# Patient Record
Sex: MALE | Race: WHITE | NOT HISPANIC OR LATINO | Employment: OTHER | URBAN - METROPOLITAN AREA
[De-identification: names, ages, dates, MRNs, and addresses within clinical notes are randomized per-mention and may not be internally consistent; named-entity substitution may affect disease eponyms.]

---

## 2017-01-16 ENCOUNTER — TRANSFERRED RECORDS (OUTPATIENT)
Dept: HEALTH INFORMATION MANAGEMENT | Facility: CLINIC | Age: 77
End: 2017-01-16

## 2017-02-07 ENCOUNTER — TRANSFERRED RECORDS (OUTPATIENT)
Dept: HEALTH INFORMATION MANAGEMENT | Facility: CLINIC | Age: 77
End: 2017-02-07

## 2017-02-13 DIAGNOSIS — I73.00 RAYNAUD'S SYNDROME: Primary | ICD-10-CM

## 2017-02-13 DIAGNOSIS — I10 ESSENTIAL HYPERTENSION: ICD-10-CM

## 2017-02-13 RX ORDER — AMLODIPINE BESYLATE 5 MG/1
5 TABLET ORAL DAILY
Qty: 90 TABLET | Refills: 2 | Status: SHIPPED | OUTPATIENT
Start: 2017-02-13 | End: 2017-12-07

## 2017-02-21 ENCOUNTER — OFFICE VISIT (OUTPATIENT)
Dept: FAMILY MEDICINE | Facility: CLINIC | Age: 77
End: 2017-02-21

## 2017-02-21 VITALS
SYSTOLIC BLOOD PRESSURE: 126 MMHG | TEMPERATURE: 98.4 F | HEIGHT: 68 IN | DIASTOLIC BLOOD PRESSURE: 85 MMHG | OXYGEN SATURATION: 98 % | WEIGHT: 171 LBS | HEART RATE: 74 BPM | BODY MASS INDEX: 25.91 KG/M2

## 2017-02-21 DIAGNOSIS — Z01.84 IMMUNITY STATUS TESTING: ICD-10-CM

## 2017-02-21 DIAGNOSIS — Z00.00 MEDICARE ANNUAL WELLNESS VISIT, SUBSEQUENT: Primary | ICD-10-CM

## 2017-02-21 DIAGNOSIS — N40.0 PROSTATIC HYPERPLASIA: ICD-10-CM

## 2017-02-21 DIAGNOSIS — I10 ESSENTIAL HYPERTENSION: ICD-10-CM

## 2017-02-21 DIAGNOSIS — Z13.220 SCREENING CHOLESTEROL LEVEL: ICD-10-CM

## 2017-02-21 DIAGNOSIS — Z12.11 SCREEN FOR COLON CANCER: ICD-10-CM

## 2017-02-21 ASSESSMENT — PAIN SCALES - GENERAL: PAINLEVEL: NO PAIN (0)

## 2017-02-21 NOTE — LETTER
[facility]  [address]  [facility] [address]  Phone: [phone]  Fax: [phone]       Date: February 23, 2017      Joe GARCIA Yann  [address]  [facility] [address]        Hi Oli,    It was good to see you yesterday.  Here's a copy of your lab results.    First, you are immune to Chicken Pox, so you definitely had that as a child.  However, this means that you could get shingles.  Therefore, I recommend that you check with your insurance company and see if the shingles vaccine (Zostavax) is covered.  If it is, you should receive it.    Your cholesterol panel is about where it's been.    Based on your results, I plugged into this calculation:    The 10-year ASCVD risk score (Xavi SCHAFER Jr., et al., 2013) is: 33.7%    Values used to calculate the score:      Age: 76 years      Sex: Male      Is Non- : No      Diabetic: No      Tobacco smoker: No      Systolic Blood Pressure: 126 mmHg      Is Prescribed Antihypertensives: Yes      HDL Cholesterol: 42 mg/dL      Total Cholesterol: 237 mg/dL    As you can see, your risk is ~1 in 3 of having a heart attack in the next 10 years.  (This is largely based on your age alone.)  If we lowered your cholesterol level with a medication, we could drop your risk to ~16-20%.  Is this something you'd like to try?    Overall, your basic metabolic panel (which measures your glucose or sugar level, calcium, kidney function tests, and electrolytes) was completely normal, top to bottom.    Take care!    Van Rojas   Lipid Profile   Result Value Ref Range    Cholesterol 237 (H) <200 mg/dL      Comment:      Desirable:       <200 mg/dl    Triglycerides 242 (H) <150 mg/dL      Comment:      Borderline high:  150-199 mg/dl   High:             200-499 mg/dl   Very high:       >499 mg/dl      HDL Cholesterol 42 >39 mg/dL    LDL Cholesterol Calculated 146 (H)  <100 mg/dL      Comment:      Above desirable:  100-129 mg/dl   Borderline High:  130-159 mg/dL   High:             160-189 mg/dL   Very high:       >189 mg/dl      Non HDL Cholesterol 195 (H) <130 mg/dL      Comment:      Above Desirable:  130-159 mg/dl   Borderline high:  160-189 mg/dl   High:             190-219 mg/dl   Very high:       >219 mg/dl     Varicella Zoster Virus Antibody IgG   Result Value Ref Range    Varicella Zoster Virus Antibody IgG (H) 0.0 - 0.8 AI     >8.0  Positive, suggests prev. exposure and probable immunity   Antibody index (AI) values reflect qualitative changes in antibody   concentration that cannot be directly associated with clinical condition or   disease state.     Basic metabolic panel   Result Value Ref Range    Sodium 138 133 - 144 mmol/L    Potassium 4.2 3.4 - 5.3 mmol/L    Chloride 103 94 - 109 mmol/L    Carbon Dioxide 28 20 - 32 mmol/L    Anion Gap 8 3 - 14 mmol/L    Glucose 88 70 - 99 mg/dL    Urea Nitrogen 18 7 - 30 mg/dL    Creatinine 1.25 0.66 - 1.25 mg/dL    GFR Estimate 56 (L) >60 mL/min/1.7m2      Comment:      Non  GFR Calc    GFR Estimate If Black 68 >60 mL/min/1.7m2      Comment:       GFR Calc    Calcium 9.2 8.5 - 10.1 mg/dL

## 2017-02-21 NOTE — MR AVS SNAPSHOT
After Visit Summary   2/21/2017    Joe Lerner    MRN: [id number]           Patient Information     Date Of Birth          1940        Visit Information        Provider Department      2/21/2017 1:00 PM Van Foss MD [facility]        Today's Diagnoses     Medicare annual wellness visit, subsequent    -  1    Needs flu shot        Prostatic hyperplasia        Essential hypertension        Screening cholesterol level        Screen for colon cancer        Immunity status testing          Care Instructions      Preventive Health Recommendations:       Male Ages 65 and over    Yearly exam:             See your health care provider every year in order to  o   Review health changes.   o   Discuss preventive care.    o   Review your medicines if your doctor has prescribed any.    Talk with your health care provider about whether you should have a test to screen for prostate cancer (PSA).    Every 3 years, have a diabetes test (fasting glucose). If you are at risk for diabetes, you should have this test more often.    Every 5 years, have a cholesterol test. Have this test more often if you are at risk for high cholesterol or heart disease.     Every 10 years, have a colonoscopy. Or, have a yearly FIT test (stool test). These exams will check for colon cancer.    Talk to with your health care provider about screening for Abdominal Aortic Aneurysm if you have a family history of AAA or have a history of smoking.  Shots:     Get a flu shot each year.     Get a tetanus shot every 10 years.     Talk to your doctor about your pneumonia vaccines. There are now two you should receive - Pneumovax (PPSV 23) and Prevnar (PCV 13).    Talk to your doctor about a shingles vaccine.     Talk to your doctor about the hepatitis B vaccine.  Nutrition:     Eat at least 5 servings of fruits and vegetables each day.     Eat whole-grain bread, whole-wheat pasta and brown rice instead of white grains and  "rice.     Talk to your doctor about Calcium and Vitamin D.   Lifestyle    Exercise for at least 150 minutes a week (30 minutes a day, 5 days a week). This will help you control your weight and prevent disease.     Limit alcohol to one drink per day.     No smoking.     Wear sunscreen to prevent skin cancer.     See your dentist every six months for an exam and cleaning.     See your eye doctor every 1 to 2 years to screen for conditions such as glaucoma, macular degeneration and cataracts.        Follow-ups after your visit        Future tests that were ordered for you today     Open Future Orders        Priority Expected Expires Ordered    Fecal colorectal cancer screen FIT Routine 3/14/2017 5/16/2017 2/21/2017            Who to contact     Please call your clinic at [phone] to:    Ask questions about your health    Make or cancel appointments    Discuss your medicines    Learn about your test results    Speak to your doctor   If you have compliments or concerns about an experience at your clinic, or if you wish to file a complaint, please contact [facility] Patient Relations at [phone] or email us at [email or web address]         Additional Information About Your Visit        Care EveryWhere ID     This is your Care EveryWhere ID. This could be used by other organizations to access your [place] medical records  [id number]        Your Vitals Were     Pulse Temperature Height Pulse Oximetry BMI (Body Mass Index)       74 98.4  F (36.9  C) (Oral) 5' 7.72\" (172 cm) 98% 26.22 kg/m2        Blood Pressure from Last 3 Encounters:   02/21/17 126/85   12/12/16 117/75   11/04/15 127/79    Weight from Last 3 Encounters:   02/21/17 171 lb (77.6 kg)   12/12/16 175 lb (79.4 kg)   11/04/15 170 lb 1.3 oz (77.1 kg)              We Performed the Following     Basic metabolic panel     Lipid Profile     Varicella Zoster Virus Antibody IgG        Primary Care Provider Office Phone # " Fax #    Van Foss [phone]       [facility] [address] [place] [id number]        Thank you!     Thank you for choosing [facility]  for your care. Our goal is always to provide you with excellent care. Hearing back from our patients is one way we can continue to improve our services. Please take a few minutes to complete the written survey that you may receive in the mail after your visit with us. Thank you!             Your Updated Medication List - Protect others around you: Learn how to safely use, store and throw away your medicines at www.disposemymeds.org.          This list is accurate as of: 2/21/17  3:56 PM.  Always use your most recent med list.                   Brand Name Dispense Instructions for use    amLODIPine 5 MG tablet    NORVASC    90 tablet    Take 1 tablet (5 mg) by mouth daily       ANDROGEL 1% 25 MG/2.5GM (1%) gel   Generic drug:  testosterone      Place 25 mg onto the skin daily       clonazePAM 2 MG tablet    klonoPIN    30 tablet    Take 1 tab at bedtime for sleep - may refill every 30 days.  Needs appt for any further refills       HERBALS          lisinopril 5 MG tablet    PRINIVIL/ZESTRIL    90 tablet    Take 1 tablet (5 mg) by mouth daily       SLEEP AID PO          VIAGRA 50 MG cap/tab   Generic drug:  sildenafil      Take 100 mg by mouth daily as needed       vitamin D 1000 UNITS capsule      Take 1 capsule by mouth daily

## 2017-02-21 NOTE — NURSING NOTE
"    Chief Complaint   Patient presents with     Physical     Fall Risk Assessment question.     76 year old      Blood pressure 126/85, pulse 74, temperature 98.4  F (36.9  C), temperature source Oral, height 5' 7.72\" (172 cm), weight 171 lb (77.6 kg), SpO2 98 %. Body mass index is 26.22 kg/(m^2).    Wt Readings from Last 2 Encounters:   02/21/17 171 lb (77.6 kg)   12/12/16 175 lb (79.4 kg)     BP Readings from Last 3 Encounters:   02/21/17 126/85   12/12/16 117/75   11/04/15 127/79       Patient Active Problem List   Diagnosis     Preventative health care     Hypertension     Prostatic hyperplasia     Raynaud's syndrome     Persistent insomnia     Hypotestosteronism       Current Outpatient Prescriptions   Medication Sig Dispense Refill     amLODIPine (NORVASC) 5 MG tablet Take 1 tablet (5 mg) by mouth daily 90 tablet 2     lisinopril (PRINIVIL/ZESTRIL) 5 MG tablet Take 1 tablet (5 mg) by mouth daily 90 tablet 0     testosterone (ANDROGEL 1%) 25 MG/2.5GM (1%) gel Place 25 mg onto the skin daily       clonazePAM (KLONOPIN) 2 MG tablet Take 1 tab at bedtime for sleep - may refill every 30 days.  Needs appt for any further refills 30 tablet 2     HERBALS        Cholecalciferol (VITAMIN D) 1000 UNITS capsule Take 1 capsule by mouth daily       sildenafil (VIAGRA) 50 MG tablet Take 100 mg by mouth daily as needed          Social History   Substance Use Topics     Smoking status: Never Smoker     Smokeless tobacco: Never Used     Alcohol use No         Health Maintenance Due   Topic Date Due     ADVANCE DIRECTIVE PLANNING Q5 YRS (NO INBASKET)  10/12/1958     FALL RISK ASSESSMENT  10/12/2004       NICK MERCEDES CMA  February 21, 2017 1:00 PM    "

## 2017-02-22 LAB
ANION GAP SERPL CALCULATED.3IONS-SCNC: 8 MMOL/L (ref 3–14)
BUN SERPL-MCNC: 18 MG/DL (ref 7–30)
CALCIUM SERPL-MCNC: 9.2 MG/DL (ref 8.5–10.1)
CHLORIDE SERPL-SCNC: 103 MMOL/L (ref 94–109)
CHOLEST SERPL-MCNC: 237 MG/DL
CO2 SERPL-SCNC: 28 MMOL/L (ref 20–32)
CREAT SERPL-MCNC: 1.25 MG/DL (ref 0.66–1.25)
GFR SERPL CREATININE-BSD FRML MDRD: 56 ML/MIN/1.7M2
GLUCOSE SERPL-MCNC: 88 MG/DL (ref 70–99)
HDLC SERPL-MCNC: 42 MG/DL
LDLC SERPL CALC-MCNC: 146 MG/DL
NONHDLC SERPL-MCNC: 195 MG/DL
POTASSIUM SERPL-SCNC: 4.2 MMOL/L (ref 3.4–5.3)
SODIUM SERPL-SCNC: 138 MMOL/L (ref 133–144)
TRIGL SERPL-MCNC: 242 MG/DL
VZV IGG SER QL IA: ABNORMAL AI (ref 0–0.8)

## 2017-02-24 DIAGNOSIS — Z12.11 SCREEN FOR COLON CANCER: ICD-10-CM

## 2017-02-24 PROCEDURE — 82274 ASSAY TEST FOR BLOOD FECAL: CPT | Performed by: FAMILY MEDICINE

## 2017-02-26 LAB — HEMOCCULT STL QL IA: NEGATIVE

## 2017-03-05 NOTE — PROGRESS NOTES
CHIEF COMPLAINT:  Annual physical exam.      HISTORY OF PRESENT ILLNESS:  Joe is a 76-year-old gentleman here for the above.  He is in the process of retiring and has been working 7 days a week.  He is looking forward to cutting back a bit.      ACTIVE MEDICAL PROBLEMS:  Reviewed.      CURRENT MEDICATIONS:  Reviewed.      SOCIAL HISTORY, FAMILY HISTORY AND PAST SURGICAL HISTORY:  Reviewed.      HEALTHCARE MAINTENANCE:  He wears glasses and eyes are checked every 6 months.  He was just there and given that the intraocular pressure has improved so much, he does not have to go back for about another a year.  From a hearing standpoint, there has been no change.  He saw my colleague, Dr. Sullivan and had some cerumen removed from his left ear.  Dental care is up-to-date and he goes in every 6 months.  Blood pressure is ideal at 126/85.  BMI very close to ideal at 26.22.  Weight has dropped from 175 to 171 intentionally.  He is taking a supplement, believes it is helping him.  From an immunization standpoint, we are going to go ahead and check a chickenpox (varicella zoster) virus antibody level as he cannot recall ever having chickenpox.  He would like to check that before getting the shingles vaccine, which he is open to that.  We will check a lipid panel.  From a colon cancer screening standpoint, we are going to do a FIT card.  We will also check a metabolic panel.      From a Medicare screening standpoint, falls assessment was done.  He has had no falls and does not use any adaptive devices at home.  He is quite physically active.  PHQ-2 was 0.  No  memory loss concerns.  Finally, he is able to take care of all activities of daily living at home without assistance.  As mentioned, he has been working 7 days a week and is looking at retiring.      OBJECTIVE:  Oli is in absolutely no distress.  Blood pressure 126/85 with a heart rate of 74.  Temperature is 98.4.  He is 5 feet 7.72 inches in height and weighs 171 with  a BMI of 26.22.  O2 sats are 98% on room air.  He is completely alert and oriented x3.  Affect is very upbeat.   HEENT:  Head is normocephalic, atraumatic.  Ears are free of any cerumen.  The cerumen that had been present in the left ear is now gone.  No pain with palpation of the frontal or maxillary sinuses.  Eyes are grossly normal.  Nose is free of any congestion or discharge.  Teeth in good repair.  Mucous membranes are moist.  Throat looks benign.   NECK:  Supple without any anterior or posterior chain adenopathy.  No carotid bruits are heard.   BACK:  Smooth and straight.  No pain to percussion.  No CVA tenderness.   LUNGS:  Clear to auscultation bilaterally.   HEART:  Reveals a regular rate and rhythm without murmur.   Ankles are free of any edema.      Labs are pending.      ASSESSMENT AND PLAN:   1.  Adult physical exam, up-to-date with healthcare maintenance strategies.   2.  Lipid panel and basic metabolic panels both pending.   3.  FIT card for colon cancer screening purposes.   4.  Varicella zoster titer screening for immune status testing.   5.  Call with any problems or questions.

## 2017-03-15 DIAGNOSIS — I10 HYPERTENSION: ICD-10-CM

## 2017-03-16 RX ORDER — LISINOPRIL 5 MG/1
TABLET ORAL
Qty: 90 TABLET | Refills: 3 | Status: SHIPPED | OUTPATIENT
Start: 2017-03-16 | End: 2018-04-20

## 2017-03-17 DIAGNOSIS — F51.01 PRIMARY INSOMNIA: ICD-10-CM

## 2017-03-17 RX ORDER — CLONAZEPAM 2 MG/1
TABLET ORAL
Qty: 30 TABLET | Refills: 2 | Status: SHIPPED | OUTPATIENT
Start: 2017-03-17 | End: 2017-06-21

## 2017-04-04 ENCOUNTER — ALLIED HEALTH/NURSE VISIT (OUTPATIENT)
Dept: FAMILY MEDICINE | Facility: CLINIC | Age: 77
End: 2017-04-04

## 2017-04-04 DIAGNOSIS — Z23 NEED FOR SHINGLES VACCINE: Primary | ICD-10-CM

## 2017-04-04 NOTE — MR AVS SNAPSHOT
After Visit Summary   2017    Joe Lerner    MRN: [id number]           Patient Information     Date Of Birth          1940        Visit Information        Provider Department      2017 1:15 PM Nurse, [facility]        Today's Diagnoses     Need for shingles vaccine    -  1       Follow-ups after your visit        Who to contact     Please call your clinic at [phone] to:    Ask questions about your health    Make or cancel appointments    Discuss your medicines    Learn about your test results    Speak to your doctor   If you have compliments or concerns about an experience at your clinic, or if you wish to file a complaint, please contact [facility] Patient Relations at [phone] or email us at [email or web address]         Additional Information About Your Visit        MyChart Information     [employer] is an electronic gateway that provides easy, online access to your medical records. With [employer], you can request a clinic appointment, read your test results, renew a prescription or communicate with your care team.     To sign up for [employer] visit the website at [email or web address]   You will be asked to enter the access code listed below, as well as some personal information. Please follow the directions to create your username and password.     Your access code is: [email or web address]  Expires: 7/3/2017  1:49 PM     Your access code will  in 90 days. If you need help or a new code, please contact your [facility] or call [phone] for assistance.        Care EveryWhere ID     This is your Care EveryWhere ID. This could be used by other organizations to access your [place] medical records  [id number]         Blood Pressure from Last 3 Encounters:   17 126/85   16 117/75   11/04/15 127/79    Weight from Last 3 Encounters:   17 171 lb (77.6 kg)   16 175 lb (79.4  kg)   11/04/15 170 lb 1.3 oz (77.1 kg)              We Performed the Following     VACCINE ADMINISTRATION, INITIAL     ZOSTER VACC LIVE SUBQ NJX        Primary Care Provider Office Phone # Fax #    Van Foss [phone]       [facility] [address] [facility] [address]        Thank you!     Thank you for choosing [facility]  for your care. Our goal is always to provide you with excellent care. Hearing back from our patients is one way we can continue to improve our services. Please take a few minutes to complete the written survey that you may receive in the mail after your visit with us. Thank you!             Your Updated Medication List - Protect others around you: Learn how to safely use, store and throw away your medicines at www.disposemymeds.org.          This list is accurate as of: 4/4/17  1:49 PM.  Always use your most recent med list.                   Brand Name Dispense Instructions for use    amLODIPine 5 MG tablet    NORVASC    90 tablet    Take 1 tablet (5 mg) by mouth daily       ANDROGEL 1% 25 MG/2.5GM (1%) gel   Generic drug:  testosterone      Place 25 mg onto the skin daily       clonazePAM 2 MG tablet    klonoPIN    30 tablet    TAKE 1 TABLET BY MOUTH AT BEDTIME AS NEEDED FOR SLEEP       HERBALS          lisinopril 5 MG tablet    PRINIVIL/ZESTRIL    90 tablet    TAKE 1 TABLET BY MOUTH EVERY DAY       SLEEP AID PO          VIAGRA 50 MG cap/tab   Generic drug:  sildenafil      Take 100 mg by mouth daily as needed       vitamin D 1000 UNITS capsule      Take 1 capsule by mouth daily

## 2017-04-04 NOTE — NURSING NOTE
Joe Lerner comes into clinic today at the request of Shingle vaccine. Ordering Provider for Dr. Foss.      This service provided today was under the supervising provider of the radha White, who was available if needed.    Rosangela Lao

## 2017-04-06 ENCOUNTER — TELEPHONE (OUTPATIENT)
Dept: FAMILY MEDICINE | Facility: CLINIC | Age: 77
End: 2017-04-06

## 2017-04-06 NOTE — TELEPHONE ENCOUNTER
----- Message from Van Foss MD sent at 4/4/2017  7:59 AM CDT -----  Regarding: RE: Medication to prevent heart attacks?  Contact: [phone]  Yes, the safest, best, most inexpensive way to do this is to take ASA 81 mg daily.    Thanks!    Van  ----- Message -----     From: Rita Marvin RN     Sent: 4/3/2017   2:48 PM       To: Van Foss MD  Subject: FW: Medication to prevent heart attacks?         Any idea what this is about ? Baby ASA? I don't see anything in your note  ----- Message -----     From: Melanie Viveros     Sent: 4/3/2017  12:12 PM       To: [facility] Nurse Pool  Subject: Medication to prevent heart attacks?             Pt wanted to know about the medication Dr. Foss told him about re: lessening the percentage of having a heart attack. He wanted to see about starting it.   Please call pt back at [phone]    Thank you  Melanie Viveros  Intake representative  Call Center

## 2017-06-21 DIAGNOSIS — F51.01 PRIMARY INSOMNIA: ICD-10-CM

## 2017-06-21 RX ORDER — CLONAZEPAM 2 MG/1
TABLET ORAL
Qty: 30 TABLET | Refills: 2
Start: 2017-06-21 | End: 2017-10-16

## 2017-07-13 ENCOUNTER — TELEPHONE (OUTPATIENT)
Dept: FAMILY MEDICINE | Facility: CLINIC | Age: 77
End: 2017-07-13

## 2017-07-13 NOTE — TELEPHONE ENCOUNTER
----- Message from Gopal Staffordnick sent at 7/10/2017  1:13 PM CDT -----  Regarding: PT has questions about self colonoscopy kit  Contact: [phone]  PT stated that Dr. Foss gave him a self colonoscopy kit and sent samples in a while ago.  PT was wondering if the clinic has received a response or results back.  PT would like a call back and can be reached at [phone].    Thank you,  Gopal    [facility]

## 2017-10-16 DIAGNOSIS — F51.01 PRIMARY INSOMNIA: ICD-10-CM

## 2017-10-16 RX ORDER — CLONAZEPAM 2 MG/1
TABLET ORAL
Qty: 30 TABLET | Refills: 2
Start: 2017-10-16 | End: 2018-01-18

## 2017-12-07 DIAGNOSIS — I10 ESSENTIAL HYPERTENSION: ICD-10-CM

## 2017-12-07 RX ORDER — AMLODIPINE BESYLATE 5 MG/1
5 TABLET ORAL DAILY
Qty: 90 TABLET | Refills: 0 | Status: SHIPPED | OUTPATIENT
Start: 2017-12-07 | End: 2018-03-08

## 2018-01-18 DIAGNOSIS — F51.01 PRIMARY INSOMNIA: ICD-10-CM

## 2018-01-18 RX ORDER — CLONAZEPAM 2 MG/1
TABLET ORAL
Qty: 30 TABLET | Refills: 2 | Status: SHIPPED | OUTPATIENT
Start: 2018-01-18 | End: 2018-04-17

## 2018-01-18 NOTE — TELEPHONE ENCOUNTER
I called this prescription for Clonazepam into  [facility].  Approved per Dr. Foss.  Jaylin JOSE  [facility]  January 18, 2018 3:57 PM

## 2018-02-08 ENCOUNTER — TRANSFERRED RECORDS (OUTPATIENT)
Dept: HEALTH INFORMATION MANAGEMENT | Facility: CLINIC | Age: 78
End: 2018-02-08

## 2018-02-27 ENCOUNTER — OFFICE VISIT (OUTPATIENT)
Dept: FAMILY MEDICINE | Facility: CLINIC | Age: 78
End: 2018-02-27
Payer: COMMERCIAL

## 2018-02-27 VITALS
TEMPERATURE: 97.7 F | OXYGEN SATURATION: 99 % | HEIGHT: 69 IN | HEART RATE: 66 BPM | BODY MASS INDEX: 25.05 KG/M2 | SYSTOLIC BLOOD PRESSURE: 114 MMHG | DIASTOLIC BLOOD PRESSURE: 77 MMHG | WEIGHT: 169.13 LBS

## 2018-02-27 DIAGNOSIS — I10 ESSENTIAL HYPERTENSION: ICD-10-CM

## 2018-02-27 DIAGNOSIS — Z00.00 MEDICARE ANNUAL WELLNESS VISIT, SUBSEQUENT: Primary | ICD-10-CM

## 2018-02-27 DIAGNOSIS — Z13.220 SCREENING CHOLESTEROL LEVEL: ICD-10-CM

## 2018-02-27 DIAGNOSIS — Z12.11 SCREENING FOR COLON CANCER: ICD-10-CM

## 2018-02-27 DIAGNOSIS — N40.0 PROSTATIC HYPERPLASIA: ICD-10-CM

## 2018-02-27 LAB
BUN SERPL-MCNC: 15 MG/DL (ref 7–30)
CALCIUM SERPL-MCNC: 9.3 MG/DL (ref 8.5–10.4)
CHLORIDE SERPLBLD-SCNC: 104 MMOL/L (ref 94–109)
CHOLEST SERPL-MCNC: 219 MG/DL (ref 0–200)
CHOLEST/HDLC SERPL: 4.3 {RATIO} (ref 0–5)
CO2 SERPL-SCNC: 29 MMOL/L (ref 20–32)
CREAT SERPL-MCNC: 1.1 MG/DL (ref 0.8–1.5)
EGFR CALCULATED (BLACK REFERENCE): 83.3
EGFR CALCULATED (NON BLACK REFERENCE): 68.8
FASTING SPECIMEN: YES
GLUCOSE SERPL-MCNC: 95 MG/DL (ref 60–109)
HDLC SERPL-MCNC: 51 MG/DL
LDLC SERPL CALC-MCNC: 137 MG/DL (ref 0–129)
POTASSIUM SERPL-SCNC: 4 MMOL/L (ref 3.4–5.3)
SODIUM SERPL-SCNC: 141 MMOL/L (ref 133–144)
TRIGL SERPL-MCNC: 152 MG/DL (ref 0–150)
VLDL-CHOLESTEROL: 30 (ref 7–32)

## 2018-02-27 NOTE — LETTER
"                                      [facility]  [address]  [facility] [address]  Phone: [phone]  Fax: [phone]       Date: February 28, 2018      Joe JOSE Lerner  [address]  [facility] [address]        Hi Oli,    It was great to see you yesterday (Tuesday).  Here's a copy of your lab results.    Overall, your lipid/cholesterol panel looks quite good.  Your total has dropped from 237 to 219, the best/lowest it's been in nearly two years.  Your HDL (or \"good\") cholesterol is the highest/best it's ever been--all due to your walking!  The LDL (\"bad\") cholesterol level is slightly elevated, but I'm not concerned about that.    Your basic metabolic panel (which measures your glucose or sugar level, calcium, kidney function tests, and electrolytes) was completely normal, top to bottom.    Take care!    Van Rojas   Basic Metabolic Panel ([place])   Result Value Ref Range    Glucose 95.0 60.0 - 109.0 mg/dL    Urea Nitrogen 15.0 7.0 - 30.0 mg/dL    Calcium 9.3 8.5 - 10.4 mg/dL    Creatinine 1.1 0.8 - 1.5 mg/dL    eGFR Calculated (Non Black Reference) 68.8 >60.0    eGFR Calculated (Black Reference) 83.3 >60.0    Sodium 141.0 133.0 - 144.0 mmol/L    Potassium 4.0 3.4 - 5.3 mmol/L    Chloride 104.0 94.0 - 109.0 mmol/L    Carbon Dioxide 29.0 20.0 - 32.0 mmol/L   Lipid Panel ([place])   Result Value Ref Range    FASTING SPECIMEN yes     Cholesterol 219.0 (H) 0.0 - 200.0    HDL Cholesterol 51.0 >40.0    Triglycerides 152.0 (H) 0.0 - 150.0    Cholesterol/HDL Ratio 4.3 0.0 - 5.0    LDL Cholesterol Direct 137.0 (H) 0.0 - 129.0    VLDL-Cholesterol 30.0 7.0 - 32.0             "

## 2018-02-27 NOTE — MR AVS SNAPSHOT
"              After Visit Summary   2/27/2018    Joe Lerner    MRN: [id number]           Patient Information     Date Of Birth          1940        Visit Information        Provider Department      2/27/2018 1:00 PM Van Foss MD [facility]        Today's Diagnoses     Medicare annual wellness visit, subsequent    -  1    Essential hypertension        Screening cholesterol level        Prostatic hyperplasia        Screening for colon cancer           Follow-ups after your visit        Future tests that were ordered for you today     Open Future Orders        Priority Expected Expires Ordered    Fecal colorectal cancer screen FIT Routine 3/20/2018 5/22/2018 2/27/2018            Who to contact     Please call your clinic at [phone] to:    Ask questions about your health    Make or cancel appointments    Discuss your medicines    Learn about your test results    Speak to your doctor            Additional Information About Your Visit        Care EveryWhere ID     This is your Care EveryWhere ID. This could be used by other organizations to access your [facility] medical records  [id number]        Your Vitals Were     Pulse Temperature Height Pulse Oximetry BMI (Body Mass Index)       66 97.7  F (36.5  C) (Oral) 5' 9.05\" (175.4 cm) 99% 24.94 kg/m2        Blood Pressure from Last 3 Encounters:   02/27/18 114/77   02/21/17 126/85   12/12/16 117/75    Weight from Last 3 Encounters:   02/27/18 169 lb 2 oz (76.7 kg)   02/21/17 171 lb (77.6 kg)   12/12/16 175 lb (79.4 kg)              We Performed the Following     Basic Metabolic Panel ([place])     Lipid Panel ([place])        Primary Care Provider Office Phone # Fax #    Van Foss [phone]       [address] [place] [id number]        Equal Access to Services     OLENA HEALY AH: joaquim Ayala qaybta kaalmada adeegyada, waxay idiin hayaan adeeg kharash la'aan . So wa " [phone].    ATENCIÓN: Si ann wyatt, tiene a mcmahan disposición servicios gratuitos de asistencia lingüística. Rafael ledesma [phone].    We comply with applicable federal civil rights laws and [place] laws. We do not discriminate on the basis of race, color, national origin, age, disability, sex, sexual orientation, or gender identity.            Thank you!     Thank you for choosing [facility]  for your care. Our goal is always to provide you with excellent care. Hearing back from our patients is one way we can continue to improve our services. Please take a few minutes to complete the written survey that you may receive in the mail after your visit with us. Thank you!             Your Updated Medication List - Protect others around you: Learn how to safely use, store and throw away your medicines at www.disposemymeds.org.          This list is accurate as of 2/27/18  1:32 PM.  Always use your most recent med list.                   Brand Name Dispense Instructions for use Diagnosis    amLODIPine 5 MG tablet    NORVASC    90 tablet    Take 1 tablet (5 mg) by mouth daily    Essential hypertension       ANDROGEL 1% 25 MG/2.5GM (1%) gel   Generic drug:  testosterone      Place 25 mg onto the skin daily        clonazePAM 2 MG tablet    klonoPIN    30 tablet    TAKE 1 TABLET BY MOUTH AT BEDTIME AS NEEDED FOR SLEEP.  Must last 30 days between refills.    Primary insomnia       HERBALS           lisinopril 5 MG tablet    PRINIVIL/ZESTRIL    90 tablet    TAKE 1 TABLET BY MOUTH EVERY DAY    Hypertension       SLEEP AID PO           VIAGRA 50 MG tablet   Generic drug:  sildenafil      Take 100 mg by mouth daily as needed        vitamin D 1000 UNITS capsule      Take 1 capsule by mouth daily

## 2018-02-27 NOTE — NURSING NOTE
"77 year old  Chief Complaint   Patient presents with     Physical     no concerns       Blood pressure 114/77, pulse 66, temperature 97.7  F (36.5  C), temperature source Oral, height 5' 9.05\" (175.4 cm), weight 169 lb 2 oz (76.7 kg), SpO2 99 %. Body mass index is 24.94 kg/(m^2).  Patient Active Problem List   Diagnosis     Preventative health care     Hypertension     Prostatic hyperplasia     Raynaud's syndrome     Persistent insomnia     Hypotestosteronism       Wt Readings from Last 2 Encounters:   02/27/18 169 lb 2 oz (76.7 kg)   02/21/17 171 lb (77.6 kg)     BP Readings from Last 3 Encounters:   02/27/18 114/77   02/21/17 126/85   12/12/16 117/75         Current Outpatient Prescriptions   Medication     clonazePAM (KLONOPIN) 2 MG tablet     amLODIPine (NORVASC) 5 MG tablet     lisinopril (PRINIVIL/ZESTRIL) 5 MG tablet     Doxylamine Succinate, Sleep, (SLEEP AID PO)     testosterone (ANDROGEL 1%) 25 MG/2.5GM (1%) gel     HERBALS     Cholecalciferol (VITAMIN D) 1000 UNITS capsule     sildenafil (VIAGRA) 50 MG tablet     No current facility-administered medications for this visit.        Social History   Substance Use Topics     Smoking status: Never Smoker     Smokeless tobacco: Never Used     Alcohol use No       Health Maintenance Due   Topic Date Due     ADVANCE DIRECTIVE PLANNING Q5 YRS  10/12/1994     FALL RISK ASSESSMENT  02/21/2018       No results found for: PAP      February 27, 2018 1:01 PM  "

## 2018-02-27 NOTE — PROGRESS NOTES
CHIEF COMPLAINT:  Medicare annual wellness visit, subsequent.      HISTORY OF PRESENT ILLNESS:  Oli is a 77-year-old who is here for the above.  Overall, he is doing very well.  [place] [employer] [place] 45 minutes a day, 7 days a week.  As a result, his blood pressure has never been better.  His weight is down a bit.  He feels great.        ACTIVE MEDICAL PROBLEMS:  Reviewed.  He does have some benign prostatic hyperplasia and is followed closely by [facility].  Most recent PSA level was elevated at 12.32.  He is on testosterone and that level was 776.5.  Other values were calculated and measured.  CBC with platelets was normal.  Again, followed in the [facility] at [facility] for those purposes.      His medications and supplements were reviewed.  He has 3 prescriptions from me and these include:  Amlodipine 5 mg daily along with lisinopril 5 mg once daily for his essential hypertension.  He is also using clonazepam 2 mg virtually every night.  This medication has allowed him to sleep for 8 hours a night.  He feels refreshed in the morning.  He wakes up exactly once to void after 4-5 hours but is able to return to sleep soon after that.      He has some Raynaud's syndrome and his feet are cooler than they had been.  The amlodipine has helped and his hands basically feel quite good.  We discussed increasing the dose at some point but not when his blood pressure is as excellent as it is right now.      MEDICARE QUESTIONS:  No problems with any activities of daily living.  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  He has had no falls at home in the last year.  No symptoms of depression.  No overt memory loss and in fact, he feels that his memory is actually better.  He has figured out some ways to remember people's names and things that would otherwise escape him.  In short, he has no concerns about his memory at this time.      HEALTHCARE  MAINTENANCE:  He wears glasses and eye care is up to date.  He was recently told by his ophthalmologist that it is becoming time to get a cataract taken care of.  He will be doing that sometime this spring or summer.  From a hearing standpoint, he always has some wax, mostly in the left ear.  This has been removed at least once by ENT.  Hearing seems to be unaffected however.  Dental care occurs every 6 months.  Blood pressure is absolutely perfect at 114/77.  BMI is also perfect at 24.94.  Immunizations up-to-date.  The only thing I can recommend today would be the new Shingrix vaccine when that becomes available.  He will get that at the pharmacy due to the fact that it is covered by Medicare there.  PSA 50 [facility].  Though he is 77, he would like to do colon cancer screening for at least another couple of years.  We will go ahead with a FIT card as this is the least invasive and is the simplest way to do that.      REVIEW OF SYSTEMS:  A 10-point review of symptoms was negative.  He has a history of some neck pain and he has now found that by getting massage therapy once or twice a month, along with chiropractic adjustment, altering his gait and posture and also wearing a dental appliance (to prevent grinding) he is doing very well in that regard.      OBJECTIVE:  Oli is in absolutely no distress.  He is alert and oriented x3.  Affect is very upbeat.  Speech is normal rate and quality.  He is very insightful.  Affect is very upbeat.  BP is 114/77 with a heart rate of 66 and regular.  Temperature is 97.7.  He is 5 feet 9 and weighs 169 with a BMI of 24.94.  O2 sats are 99% on room air.  HEENT:  Head is normocephalic, atraumatic.  Ears are free of any cerumen.  The right ear shows absolutely no cerumen in the canal.  Left canal shows some cerumen but it is certainly not obstructing the canal and certainly not his hearing any way.  He does not need to have this cleaned at this time.  There is no pain with  palpation of the frontal or maxillary sinuses.  Eyes are grossly normal.  Nose is free of any congestion or discharge.  Teeth in good repair.  Mucous membranes are moist.  Throat looks benign.  Neck is supple without adenopathy or thyromegaly.  No carotid bruits are heard, no JVD.  Back is smooth and straight.  No pain to percussion.  No CVA tenderness.  Lungs are clear to auscultation bilaterally.  Heart reveals a regular rate and rhythm without murmur.  Abdomen is benign.  Ankles are free of any edema.  Peripheral pulses are a little bit diminished.  The Raynaud's is affecting that a bit.      LABORATORY DATA:  Lipid panel, metabolic panel and a FIT card all pending.  PSA followed and CBC taken care of through [facility].      ASSESSMENT AND PLAN:   1. Medicare annual wellness visit, subsequent   2. Prostatic hyperplasia, followed closely at [facility].   3. Screening for colon cancer with a FIT card.   4. Screening for hyperlipidemia with a lipid panel.   5. Essential hypertension, on a combination of amlodipine and lisinopril.  Basic metabolic panel pending.   6. Follow up with me in approximately 1 year.   7. Call with any problems or questions.

## 2018-03-07 NOTE — PROGRESS NOTES
"[facility]  [address]  [facility] [address]  [phone]  Dept: [phone]    PRE-OP EVALUATION:  Today's date: 3/15/2018    Joe Lerner (: 1940) presents for pre-operative evaluation assessment as requested by Dr. Bravo Pitt.  He requires evaluation and anesthesia risk assessment prior to undergoing surgery/procedure for treatment of Right eye Cataract surgery .    Proposed Surgery/ Procedure: Right eye cataract extraction and lens implantation, followed by Left eye (same procedure)  Date of Surgery/ Procedure: 3/19/18 (R eye); Date TBD for Left eye  Time of Surgery/ Procedure: 9:00 a.m.  Hospital/Surgical Facility: [facility]  Fax number for surgical facility: [phone]  Primary Physician: Van Foss  Type of Anesthesia Anticipated: Local    Dear Dr. Pitt:      Thank you for having me perform the preoperative evaluation on Joe Lerner.  He has no underlying cardiovascular, neurologic or pulmonary disease.  He should be at low risk for this procedure.  Please contact me if I can be of any assistance in his care.      HISTORY OF PRESENT ILLNESS:  Mr. Lerner is a 77-year-old gentleman with cataracts that have been slowly progressing over the years.  He can tell that things look a little bit more \"dim\" and \"dull\" than they used to.  He is looking forward to having this done.      ACTIVE MEDICAL PROBLEMS:  Prostatic hyperplasia, Raynaud's syndrome, persistent insomnia, hypotestosteronism, and essential hypertension.      CURRENT MEDICATIONS:   1.  Amlodipine 5 mg once daily (for essential hypertension and his Raynaud's syndrome).   2.  Lisinopril 5 mg once daily (for his hypertension).   3.  Clonazepam 2 mg daily generally once nightly for persistent insomnia.   4.  Testosterone AndroGel for his hypotestosteronism.   5.  He also uses occasional Viagra as needed.      SOCIAL HISTORY:  Never a smoker.  No alcohol use.  " Single.  Exercise in the form of walking at least 10,000 steps a day through the [employer] system in [place].      FAMILY HISTORY:  Negative for any anesthetic complications or bleeding tendencies or any kind of surgical complications.      PAST SURGICAL HISTORY:   1.  TURP for prostatic hyperplasia on 07/12/2012.   2.  Septoplasty, approximately 30 years ago, done with topical anesthesia.   3.  [place] teeth extracted under local anesthesia.      ANESTHETIC COMPLICATIONS:  None.      BLEEDING TENDENCIES:  None.      ALLERGIES AND SENSITIVITIES TO MEDICATIONS:  None known.      REVIEW OF SYSTEMS:  A 10-point review is negative.  He had a full physical exam with me less than a month ago.  He is in excellent health and doing very well.  No signs or symptoms of any kind of respiratory infection.      OBJECTIVE:  Mr. Lerner is in absolutely no distress.  His affect is upbeat.  He is wearing glasses.  BP is 120/76 with a pulse of 79 and regular.  Temperature is 97.8.  He is 5 feet 9 and weighs 175 with a BMI of 25.84.  O2 sats are 99% on room air.   HEENT:  Head is normocephalic, atraumatic.  Ears are free of any cerumen.  The TMs look fine.  There is no pain with palpation of the frontal or maxillary sinuses.  Eyes are grossly normal.  Nose is free of any congestion or discharge.  Teeth in good repair.  Mucous membranes are moist.  Throat looks benign.   NECK:  Supple without adenopathy or thyromegaly.  No carotid bruits are heard, no JVD.   BACK:  Smooth and straight.  No pain to percussion.  No CVA tenderness.   LUNGS:  Clear to auscultation bilaterally.   HEART:  Reveals a regular rate and rhythm without murmur.   ABDOMEN:  Benign.   EXTREMITIES:  Ankles are free of any edema.  Good peripheral pulses.   SKIN:  Warm and dry.      No labs done today.  No need for an EKG.  Previous labs on 02/27 showed a basic metabolic panel that was entirely normal.  Potassium was 4.0.  Creatinine 1.1.  Estimated GFR 68.8.       ASSESSMENT AND PLAN:   1.  Mr. Lerner is a 77-year-old gentleman with bilateral cataracts.  His plan is to have the right eye taken care of on 03/19 and the left eye approximately 2 weeks later.  He has no underlying cardiovascular, pulmonary or neurologic disease.  He should be at low risk.  His hypertension is well-controlled on 2 medications.  He will take his medications as planned.  Please contact me if I can be of any further assistance in his care.      Sincerely,              Van Foss MD

## 2018-03-08 DIAGNOSIS — I10 ESSENTIAL HYPERTENSION: ICD-10-CM

## 2018-03-08 RX ORDER — AMLODIPINE BESYLATE 5 MG/1
5 TABLET ORAL DAILY
Qty: 90 TABLET | Refills: 0 | Status: SHIPPED | OUTPATIENT
Start: 2018-03-08 | End: 2018-06-14

## 2018-03-08 NOTE — TELEPHONE ENCOUNTER
Last office visit: 02/17/2017, future appointment 03/15/2018.    Prescription approved per [facility] Refill Protocol.

## 2018-03-09 DIAGNOSIS — Z12.11 SCREENING FOR COLON CANCER: ICD-10-CM

## 2018-03-09 PROCEDURE — 82274 ASSAY TEST FOR BLOOD FECAL: CPT | Performed by: FAMILY MEDICINE

## 2018-03-11 LAB — HEMOCCULT STL QL IA: NEGATIVE

## 2018-03-15 ENCOUNTER — OFFICE VISIT (OUTPATIENT)
Dept: FAMILY MEDICINE | Facility: CLINIC | Age: 78
End: 2018-03-15
Payer: COMMERCIAL

## 2018-03-15 VITALS
SYSTOLIC BLOOD PRESSURE: 120 MMHG | BODY MASS INDEX: 25.92 KG/M2 | WEIGHT: 175 LBS | OXYGEN SATURATION: 99 % | HEART RATE: 79 BPM | TEMPERATURE: 97.8 F | DIASTOLIC BLOOD PRESSURE: 76 MMHG | HEIGHT: 69 IN

## 2018-03-15 DIAGNOSIS — Z01.818 PRE-OP EVALUATION: Primary | ICD-10-CM

## 2018-03-15 ASSESSMENT — PAIN SCALES - GENERAL: PAINLEVEL: NO PAIN (0)

## 2018-03-15 NOTE — NURSING NOTE
"77 year old  Chief Complaint   Patient presents with     Pre Op Exam     Right eye cataract surgery on 3/19/18 with Dr. Bravo Pitt at [facility].        Blood pressure 120/76, pulse 79, temperature 97.8  F (36.6  C), temperature source Oral, height 5' 9\" (175.3 cm), weight 175 lb (79.4 kg), SpO2 99 %. Body mass index is 25.84 kg/(m^2).  Patient Active Problem List   Diagnosis     Preventative health care     Prostatic hyperplasia     Raynaud's syndrome     Persistent insomnia     Hypotestosteronism     Essential hypertension       Wt Readings from Last 2 Encounters:   03/15/18 175 lb (79.4 kg)   02/27/18 169 lb 2 oz (76.7 kg)     BP Readings from Last 3 Encounters:   03/15/18 120/76   02/27/18 114/77   02/21/17 126/85         Current Outpatient Prescriptions   Medication     amLODIPine (NORVASC) 5 MG tablet     clonazePAM (KLONOPIN) 2 MG tablet     lisinopril (PRINIVIL/ZESTRIL) 5 MG tablet     Doxylamine Succinate, Sleep, (SLEEP AID PO)     testosterone (ANDROGEL 1%) 25 MG/2.5GM (1%) gel     HERBALS     Cholecalciferol (VITAMIN D) 1000 UNITS capsule     sildenafil (VIAGRA) 50 MG tablet     No current facility-administered medications for this visit.        Social History   Substance Use Topics     Smoking status: Never Smoker     Smokeless tobacco: Never Used     Alcohol use No       Health Maintenance Due   Topic Date Due     ADVANCE DIRECTIVE PLANNING Q5 YRS  10/12/1994       No results found for: ORVILLE Lao CMA  March 15, 2018 11:18 AM  "

## 2018-03-15 NOTE — MR AVS SNAPSHOT
"              After Visit Summary   3/15/2018    Joe Lerner    MRN: [id number]           Patient Information     Date Of Birth          1940        Visit Information        Provider Department      3/15/2018 11:00 AM Van Foss MD [facility]        Today's Diagnoses     Pre-op evaluation    -  1       Follow-ups after your visit        Follow-up notes from your care team     Return if symptoms worsen or fail to improve.      Who to contact     Please call your clinic at [phone] to:    Ask questions about your health    Make or cancel appointments    Discuss your medicines    Learn about your test results    Speak to your doctor            Additional Information About Your Visit        Care EveryWhere ID     This is your Care EveryWhere ID. This could be used by other organizations to access your [place] medical records  [id number]        Your Vitals Were     Pulse Temperature Height Pulse Oximetry BMI (Body Mass Index)       79 97.8  F (36.6  C) (Oral) 5' 9\" (175.3 cm) 99% 25.84 kg/m2        Blood Pressure from Last 3 Encounters:   03/15/18 120/76   02/27/18 114/77   02/21/17 126/85    Weight from Last 3 Encounters:   03/15/18 175 lb (79.4 kg)   02/27/18 169 lb 2 oz (76.7 kg)   02/21/17 171 lb (77.6 kg)              Today, you had the following     No orders found for display       Primary Care Provider Office Phone # Fax #    Van Foss [phone]       [address] [place] [address]        Equal Access to Services     [facility] Center[facility] : Hadii aad ku hadasho Soomaali, waaxda luqadaha, qaybta kaalmada adeegyada, waxbibi joel fong . So [facility] [phone].    ATENCIÓN: Si habla español, tiene a mcmahan disposición servicios gratuitos de asistencia lingüística. Llame al [phone].    We comply with applicable federal civil rights laws and [place] laws. We do not discriminate on the basis of race, color, national origin, age, " disability, sex, sexual orientation, or gender identity.            Thank you!     Thank you for choosing [facility]  for your care. Our goal is always to provide you with excellent care. Hearing back from our patients is one way we can continue to improve our services. Please take a few minutes to complete the written survey that you may receive in the mail after your visit with us. Thank you!             Your Updated Medication List - Protect others around you: Learn how to safely use, store and throw away your medicines at www.disposemymeds.org.          This list is accurate as of 3/15/18  1:15 PM.  Always use your most recent med list.                   Brand Name Dispense Instructions for use Diagnosis    amLODIPine 5 MG tablet    NORVASC    90 tablet    Take 1 tablet (5 mg) by mouth daily    Essential hypertension       ANDROGEL 1% 25 MG/2.5GM (1%) gel   Generic drug:  testosterone      Place 25 mg onto the skin daily        clonazePAM 2 MG tablet    klonoPIN    30 tablet    TAKE 1 TABLET BY MOUTH AT BEDTIME AS NEEDED FOR SLEEP.  Must last 30 days between refills.    Primary insomnia       HERBALS           lisinopril 5 MG tablet    PRINIVIL/ZESTRIL    90 tablet    TAKE 1 TABLET BY MOUTH EVERY DAY    Hypertension       SLEEP AID PO           VIAGRA 50 MG tablet   Generic drug:  sildenafil      Take 100 mg by mouth daily as needed        vitamin D 1000 UNITS capsule      Take 1 capsule by mouth daily

## 2018-04-14 DIAGNOSIS — I10 HYPERTENSION: ICD-10-CM

## 2018-04-16 RX ORDER — LISINOPRIL 5 MG/1
TABLET ORAL
Qty: 90 TABLET | Refills: 3 | OUTPATIENT
Start: 2018-04-16

## 2018-04-16 NOTE — TELEPHONE ENCOUNTER
Refill request received for patient of Dr. Sullivan who is seen at [facility]. Request routed back to pharmacy to send to [facility].

## 2018-04-17 DIAGNOSIS — F51.01 PRIMARY INSOMNIA: ICD-10-CM

## 2018-04-17 DIAGNOSIS — I10 HYPERTENSION: ICD-10-CM

## 2018-04-17 RX ORDER — CLONAZEPAM 2 MG/1
TABLET ORAL
Qty: 30 TABLET | Refills: 2 | Status: SHIPPED | OUTPATIENT
Start: 2018-04-17 | End: 2018-07-16

## 2018-04-17 RX ORDER — LISINOPRIL 5 MG/1
TABLET ORAL
Qty: 90 TABLET | Refills: 3 | OUTPATIENT
Start: 2018-04-17

## 2018-04-20 DIAGNOSIS — I10 HYPERTENSION: ICD-10-CM

## 2018-04-20 DIAGNOSIS — I10 ESSENTIAL HYPERTENSION: ICD-10-CM

## 2018-04-20 DIAGNOSIS — I10 BENIGN ESSENTIAL HYPERTENSION: Primary | ICD-10-CM

## 2018-04-23 RX ORDER — LISINOPRIL 5 MG/1
TABLET ORAL
Qty: 90 TABLET | Refills: 3 | Status: SHIPPED | OUTPATIENT
Start: 2018-04-23 | End: 2019-02-28

## 2018-04-23 NOTE — TELEPHONE ENCOUNTER
Prescription approved per [facility] Refill Protocol.      Georgie Whitley RN  April 23, 2018 4:15 PM

## 2018-06-05 ENCOUNTER — TRANSFERRED RECORDS (OUTPATIENT)
Dept: HEALTH INFORMATION MANAGEMENT | Facility: CLINIC | Age: 78
End: 2018-06-05

## 2018-06-14 DIAGNOSIS — I10 ESSENTIAL HYPERTENSION: ICD-10-CM

## 2018-06-14 RX ORDER — AMLODIPINE BESYLATE 5 MG/1
5 TABLET ORAL DAILY
Qty: 90 TABLET | Refills: 3 | Status: SHIPPED | OUTPATIENT
Start: 2018-06-14 | End: 2019-02-28

## 2018-06-14 NOTE — TELEPHONE ENCOUNTER
Last office visit: 3/15/18, no future appointment.     Prescription approved per [facility] Refill Protocol.

## 2018-07-16 DIAGNOSIS — F51.01 PRIMARY INSOMNIA: ICD-10-CM

## 2018-07-16 RX ORDER — CLONAZEPAM 2 MG/1
TABLET ORAL
Qty: 30 TABLET | Refills: 2 | Status: SHIPPED | OUTPATIENT
Start: 2018-07-16 | End: 2018-10-15

## 2018-07-16 NOTE — TELEPHONE ENCOUNTER
I called this prescription for the Klonopin into the [facility] on [address] [place], [place]   Approved per Dr. Foss.  Jaylin JOSE  [facility]  July 16, 2018 1:44 PM

## 2018-08-31 ENCOUNTER — TRANSFERRED RECORDS (OUTPATIENT)
Dept: HEALTH INFORMATION MANAGEMENT | Facility: CLINIC | Age: 78
End: 2018-08-31

## 2018-10-15 DIAGNOSIS — F51.01 PRIMARY INSOMNIA: ICD-10-CM

## 2018-10-15 RX ORDER — CLONAZEPAM 2 MG/1
TABLET ORAL
Qty: 30 TABLET | Refills: 2 | Status: SHIPPED | OUTPATIENT
Start: 2018-10-15 | End: 2019-01-15

## 2018-10-15 NOTE — TELEPHONE ENCOUNTER
I called this prescription [facility]opin [facility] the [facility] on [address] [place], [place]. Approved per Dr. Foss.  Jaylin JOSE  [facility]  October 15, 2018 5:18 PM

## 2019-01-15 DIAGNOSIS — F51.01 PRIMARY INSOMNIA: ICD-10-CM

## 2019-01-15 RX ORDER — CLONAZEPAM 2 MG/1
TABLET ORAL
Qty: 30 TABLET | Refills: 0 | Status: SHIPPED | OUTPATIENT
Start: 2019-01-15 | End: 2019-02-21

## 2019-01-15 NOTE — TELEPHONE ENCOUNTER
Last time prescribed: 10/15/18 , 30 tabs/caps x 2 refills  Last office visit: 3/15/18  Next appointment: No Future Appointment Scheduled    Routing refill request to provider for review/approval because:  Drug not on the [facility] refill protocol - TO PROVIDER TO LYNN Ruby RN  01/15/19  10:03 AM

## 2019-02-11 ENCOUNTER — TRANSFERRED RECORDS (OUTPATIENT)
Dept: HEALTH INFORMATION MANAGEMENT | Facility: CLINIC | Age: 79
End: 2019-02-11

## 2019-02-21 DIAGNOSIS — F51.01 PRIMARY INSOMNIA: ICD-10-CM

## 2019-02-21 RX ORDER — CLONAZEPAM 2 MG/1
TABLET ORAL
Qty: 30 TABLET | Refills: 0 | Status: SHIPPED | OUTPATIENT
Start: 2019-02-21 | End: 2019-03-20

## 2019-02-21 NOTE — TELEPHONE ENCOUNTER
Script for clonazepam phoned to [facility].  Rita Marvin RN  [facility]

## 2019-02-25 ENCOUNTER — TRANSFERRED RECORDS (OUTPATIENT)
Dept: HEALTH INFORMATION MANAGEMENT | Facility: CLINIC | Age: 79
End: 2019-02-25

## 2019-02-26 PROBLEM — I10 ESSENTIAL HYPERTENSION: Chronic | Status: ACTIVE | Noted: 2018-02-27

## 2019-02-26 NOTE — PATIENT INSTRUCTIONS
Preventive Health Recommendations:     See your health care provider every year to    Review health changes.     Discuss preventive care.      Review your medicines if your doctor has prescribed any.    Talk with your health care provider about whether you should have a test to screen for prostate cancer (PSA).    Every 3 years, have a diabetes test (fasting glucose). If you are at risk for diabetes, you should have this test more often.    Every 5 years, have a cholesterol test. Have this test more often if you are at risk for high cholesterol or heart disease.     Every 10 years, have a colonoscopy. Or, have a yearly FIT test (stool test). These exams will check for colon cancer.    Talk to with your health care provider about screening for Abdominal Aortic Aneurysm if you have a family history of AAA or have a history of smoking.  Shots:     Get a flu shot each year.     Get a tetanus shot every 10 years.     Talk to your doctor about your pneumonia vaccines. There are now two you should receive - Pneumovax (PPSV 23) and Prevnar (PCV 13).    Talk to your pharmacist about a shingles vaccine.     Talk to your doctor about the hepatitis B vaccine.  Nutrition:     Eat at least 5 servings of fruits and vegetables each day.     Eat whole-grain bread, whole-wheat pasta and brown rice instead of white grains and rice.     Get adequate Calcium and Vitamin D.   Lifestyle    Exercise for at least 150 minutes a week (30 minutes a day, 5 days a week). This will help you control your weight and prevent disease.     Limit alcohol to one drink per day.     No smoking.     Wear sunscreen to prevent skin cancer.     See your dentist every six months for an exam and cleaning.     See your eye doctor every 1 to 2 years to screen for conditions such as glaucoma, macular degeneration and cataracts.    Personalized Prevention Plan  You are due for the preventive services outlined below.  Your care team is available to assist you in  scheduling these services.  If you have already completed any of these items, please share that information with your care team to update in your medical record.    Health Maintenance Due   Topic Date Due     Discuss Advance Directive Planning  10/12/1994     Annual Wellness Visit  02/27/2019     FALL RISK ASSESSMENT  02/27/2019     Depression Assessment 2 - yearly  03/15/2019

## 2019-02-26 NOTE — PROGRESS NOTES
"  SUBJECTIVE:   Joe Lerner is a 78 year old male who presents for Preventive Visit.  click delete button to remove this line now  click delete button to remove this line now  Are you in the first 12 months of your Medicare Part B coverage?  No    Physical Health:    In general, how would you rate your overall physical health? good    Outside of work, how many days during the week do you exercise? 6-7 days/week, walks 45 minutes a day, does some light weight lifting    Outside of work, approximately how many minutes a day do you exercise?45-60 minutes    If you drink alcohol do you typically have >3 drinks per day or >7 drinks per week? No    Do you usually eat at least 4 servings of fruit and vegetables a day, include whole grains & fiber and avoid regularly eating high fat or \"junk\" foods? Yes    Do you have any problems taking medications regularly?  No    Do you have any side effects from medications? not applicable    Needs assistance for the following daily activities: no assistance needed    Which of the following safety concerns are present in your home?  none identified     Hearing impairment: No    In the past 6 months, have you been bothered by leaking of urine? no    Mental Health:    In general, how would you rate your overall mental or emotional health? good  PHQ-2 Score:  0    Do you feel safe in your environment? Yes    Do you have a Health Care Directive? Yes: Patient states has Advance Directive and will bring in a copy to clinic.    Additional concerns to address?  No    Fall risk:  Fallen 2 or more times in the past year?: No  Any fall with injury in the past year?: No  click delete button to remove this line now    Cognitive Screening: It is clear from this interview that Joe is cognitively intact.    Do you have sleep apnea, excessive snoring or daytime drowsiness?: yes - known insomnia      # Hypertension   BP Readings from Last 5 Encounters:   02/28/19 118/75   03/15/18 120/76 "   18 114/77   17 126/85   16 117/75     Creatinine   Date Value Ref Range Status   2018 1.1 0.8 - 1.5 mg/dL Final   2017 1.25 0.66 - 1.25 mg/dL Final     Sodium   Date Value Ref Range Status   2018 141.0 133.0 - 144.0 mmol/L Final     Potassium   Date Value Ref Range Status   2018 4.0 3.4 - 5.3 mmol/L Final     - Current Regimen: Lisinopril 5mg, Amlodipine 5mg  - Missed doses?: denies  - Side effects?: denies    # Insomnia  - takes chronic clonazepam 2mg at bedtime for this problem  - works well for him, denies daytime grogginess    # Elevated PSA  - historically PSA runs between 9 and 12 over several years  - has known enlarged prostate and has had procedure to reduce in the past  - has had biopsies of his prostate in the 80s that were always normal    Reviewed and updated as needed this visit by Provider  Tobacco  Allergies  Meds  Med Hx  Surg Hx  Fam Hx  Soc Hx       Social History     Tobacco Use     Smoking status: Former Smoker     Last attempt to quit: 1988     Years since quittin.1     Smokeless tobacco: Never Used     Tobacco comment: 1/2 - 1 PPD over 30 years,    Substance Use Topics     Alcohol use: Yes     Binge frequency: Less than monthly     Current providers sharing in care for this patient include:   Patient Care Team:  Van Foss MD as PCP - General (Family Practice)  Arnulfo Sullivan MD as MD (Family Practice)    The following health maintenance items are reviewed in Epic and correct as of today:  Health Maintenance   Topic Date Due     ADVANCE DIRECTIVE PLANNING Q5 YRS  10/12/1994     MEDICARE ANNUAL WELLNESS VISIT  2020     FALL RISK ASSESSMENT  2020     PHQ-2 Q1 YR  2020     LIPID SCREEN Q5 YR MALE (SYSTEM ASSIGNED)  2023     DTAP/TDAP/TD IMMUNIZATION (3 - Td) 05/10/2023     INFLUENZA VACCINE  Completed     PNEUMOCOCCAL IMMUNIZATION 65+ LOW/MEDIUM RISK  Completed     ZOSTER IMMUNIZATION  Completed      "IPV IMMUNIZATION  Aged Out     MENINGITIS IMMUNIZATION  Aged Out     Labs reviewed in EPIC  Pneumonia Vaccine: completed    ROS:   Constitutional - no fevers, chills, night sweats, unintentional weight loss/gain   Eyes - no vision concerns   Ears/Nose/Throat - no hearing concerns, no dysphagia/odynophagia   Cardiovascular - no chest pain, palpitations   Pulmonary - no shortness of breath, wheezing, coughing   GI - no abdominal pain, constipation, diarrhea, nausea, vomiting    - no dysuria, polyuria, hematuria   Musculoskeletal - no joint or muscle pain  Integument - no rash   Neuro - no weakness, numbness, paresthesia   Heme - no easy bruising/bleeding   Endocrine - no polyuria, weight loss/gain, dry skin, excessive sweating, hair loss   Psychiatric - no feelings of depressed mood or anhedonia in past 2 weeks   Allergic/Immunologic - no history of anaphylaxis, no history of recurrent infections      OBJECTIVE:   /75 (BP Location: Right arm, Patient Position: Sitting, Cuff Size: Adult Large)   Pulse 69   Temp 97.9  F (36.6  C) (Oral)   Resp 16   Ht 1.74 m (5' 8.5\")   Wt 76.9 kg (169 lb 8 oz)   SpO2 97%   BMI 25.39 kg/m   Estimated body mass index is 25.39 kg/m  as calculated from the following:    Height as of this encounter: 1.74 m (5' 8.5\").    Weight as of this encounter: 76.9 kg (169 lb 8 oz).  EXAM:   GENERAL: healthy, alert and no distress  EYES: Eyes grossly normal to inspection, PERRL and conjunctivae and sclerae normal  HENT: ear canals and TM's normal, nose and mouth without ulcers or lesions  NECK: no adenopathy, no asymmetry, masses, or scars and thyroid normal to palpation  RESP: lungs clear to auscultation - no rales, rhonchi or wheezes  CV: regular rate and rhythm, normal S1 S2, no S3 or S4, no murmur, click or rub, no peripheral edema and peripheral pulses strong  ABDOMEN: soft, nontender, no hepatosplenomegaly, no masses and bowel sounds normal  MS: no gross musculoskeletal defects " noted, no edema  SKIN: no suspicious lesions or rashes  NEURO: Normal strength and tone, mentation intact and speech normal  PSYCH: mentation appears normal, affect normal/bright    Diagnostic Test Results:  none     The 10-year ASCVD risk score (Xavi SCHAFER Jr., et al., 2013) is: 31.8%*    Values used to calculate the score:      Age: 78 years      Sex: Male      Is Non- : No      Diabetic: No      Tobacco smoker: No      Systolic Blood Pressure: 118 mmHg      Is BP treated: Yes      HDL Cholesterol: 51 mg/dL*      Total Cholesterol: 219 mg/dL*      * - Cholesterol units were assumed for this score calculation      ASSESSMENT / PLAN:   (Z00.00) Medicare annual wellness visit, subsequent  (primary encounter diagnosis)  Comment: Age appropriate counseling provided. Up to date on vaccines. Fall risk screening negative. Lipids a little elevated in 2018 and ASCVD risk very high; however, in this physically active 78 year old man with no history of arterial disease, his likelihood of benefit from statin for primary prevention is low. I have discussed this with him and we will not start statin therapy at this time and thus not recheck lipids at this time.   PSA chronically elevated around 12; followed by urology.  Plan:     (I10) Essential hypertension  Comment: BP at goal. He is someone who a more strict BP goal consistent with the SPRINT trial seems reasonable as he is tolerating very well and is generally not very frail. Continue current regimen. BMP today.   Plan: Basic metabolic panel, lisinopril         (PRINIVIL/ZESTRIL) 5 MG tablet, amLODIPine         (NORVASC) 5 MG tablet          (G47.00) Persistent insomnia  Comment: Has enough clonazepam for now. I discussed with him some of the issues of chronic benzo use, particularly as he continues to get older. No ill effects for now and I think it is reasonable to continue.   Plan:     End of Life Planning:  Patient currently has an advanced  "directive: Yes.  Practitioner is supportive of decision.    COUNSELING:  Reviewed preventive health counseling, as reflected in patient instructions       Regular exercise       Healthy diet/nutrition    BP Readings from Last 1 Encounters:   02/28/19 118/75     Estimated body mass index is 25.39 kg/m  as calculated from the following:    Height as of this encounter: 1.74 m (5' 8.5\").    Weight as of this encounter: 76.9 kg (169 lb 8 oz).     reports that he quit smoking about 31 years ago. he has never used smokeless tobacco.      Appropriate preventive services were discussed with this patient, including applicable screening as appropriate for cardiovascular disease, diabetes, osteopenia/osteoporosis, and glaucoma.  As appropriate for age/gender, discussed screening for colorectal cancer, prostate cancer, breast cancer, and cervical cancer. Checklist reviewing preventive services available has been given to the patient.    Reviewed patients plan of care and provided an AVS. The Basic Care Plan (routine screening as documented in Health Maintenance) for Joe meets the Care Plan requirement. This Care Plan has been established and reviewed with the Patient.    Counseling Resources:  ATP IV Guidelines  Pooled Cohorts Equation Calculator  Breast Cancer Risk Calculator  FRAX Risk Assessment  ICSI Preventive Guidelines  Dietary Guidelines for Americans, 2010  USDA's MyPlate  ASA Prophylaxis  Lung CA Screening    Esteban Bowman MD  [facility]  "

## 2019-02-28 ENCOUNTER — OFFICE VISIT (OUTPATIENT)
Dept: FAMILY MEDICINE | Facility: CLINIC | Age: 79
End: 2019-02-28
Payer: COMMERCIAL

## 2019-02-28 VITALS
RESPIRATION RATE: 16 BRPM | HEIGHT: 69 IN | TEMPERATURE: 97.9 F | SYSTOLIC BLOOD PRESSURE: 118 MMHG | DIASTOLIC BLOOD PRESSURE: 75 MMHG | HEART RATE: 69 BPM | OXYGEN SATURATION: 97 % | BODY MASS INDEX: 25.1 KG/M2 | WEIGHT: 169.5 LBS

## 2019-02-28 DIAGNOSIS — G47.00 PERSISTENT INSOMNIA: ICD-10-CM

## 2019-02-28 DIAGNOSIS — I10 ESSENTIAL HYPERTENSION: Chronic | ICD-10-CM

## 2019-02-28 DIAGNOSIS — Z00.00 MEDICARE ANNUAL WELLNESS VISIT, SUBSEQUENT: Primary | ICD-10-CM

## 2019-02-28 RX ORDER — AMLODIPINE BESYLATE 5 MG/1
5 TABLET ORAL DAILY
Qty: 90 TABLET | Refills: 3 | Status: SHIPPED | OUTPATIENT
Start: 2019-02-28 | End: 2020-04-16

## 2019-02-28 RX ORDER — LISINOPRIL 5 MG/1
5 TABLET ORAL DAILY
Qty: 90 TABLET | Refills: 3 | Status: SHIPPED | OUTPATIENT
Start: 2019-02-28 | End: 2020-04-10

## 2019-02-28 RX ORDER — TADALAFIL 5 MG/1
5 TABLET ORAL DAILY
COMMUNITY
End: 2021-10-14

## 2019-02-28 SDOH — HEALTH STABILITY: MENTAL HEALTH: HOW OFTEN DO YOU HAVE 6 OR MORE DRINKS ON ONE OCCASION?: LESS THAN MONTHLY

## 2019-02-28 SDOH — HEALTH STABILITY: MENTAL HEALTH: HOW OFTEN DO YOU HAVE SIX OR MORE DRINKS ON ONE OCCASION?: LESS THAN MONTHLY

## 2019-02-28 ASSESSMENT — MIFFLIN-ST. JEOR: SCORE: 1466.35

## 2019-02-28 NOTE — NURSING NOTE
"78 year old  Chief Complaint   Patient presents with     Physical       Blood pressure 118/75, pulse 69, temperature 97.9  F (36.6  C), temperature source Oral, resp. rate 16, height 1.74 m (5' 8.5\"), weight 76.9 kg (169 lb 8 oz), SpO2 97 %. Body mass index is 25.39 kg/m .  Patient Active Problem List   Diagnosis     Preventative health care     Prostatic hyperplasia     Raynaud's syndrome     Persistent insomnia     Hypotestosteronism     Essential hypertension       Wt Readings from Last 2 Encounters:   02/28/19 76.9 kg (169 lb 8 oz)   03/15/18 79.4 kg (175 lb)     BP Readings from Last 3 Encounters:   02/28/19 118/75   03/15/18 120/76   02/27/18 114/77         Current Outpatient Medications   Medication     amLODIPine (NORVASC) 5 MG tablet     Cholecalciferol (VITAMIN D) 1000 UNITS capsule     clonazePAM (KLONOPIN) 2 MG tablet     Doxylamine Succinate, Sleep, (SLEEP AID PO)     HERBALS     lisinopril (PRINIVIL/ZESTRIL) 5 MG tablet     tadalafil (CIALIS) 5 MG tablet     testosterone (ANDROGEL 1%) 25 MG/2.5GM (1%) gel     sildenafil (VIAGRA) 50 MG tablet     No current facility-administered medications for this visit.        Social History     Tobacco Use     Smoking status: Never Smoker     Smokeless tobacco: Never Used   Substance Use Topics     Alcohol use: No     Drug use: No       Health Maintenance Due   Topic Date Due     ADVANCE DIRECTIVE PLANNING Q5 YRS  10/12/1994       No results found for: PAP      February 28, 2019 11:14 AM    "

## 2019-03-01 LAB
ANION GAP SERPL CALCULATED.3IONS-SCNC: 11 MMOL/L (ref 3–14)
BUN SERPL-MCNC: 23 MG/DL (ref 7–30)
CALCIUM SERPL-MCNC: 9.2 MG/DL (ref 8.5–10.1)
CHLORIDE SERPL-SCNC: 105 MMOL/L (ref 94–109)
CO2 SERPL-SCNC: 25 MMOL/L (ref 20–32)
CREAT SERPL-MCNC: 1.29 MG/DL (ref 0.66–1.25)
GFR SERPL CREATININE-BSD FRML MDRD: 52 ML/MIN/{1.73_M2}
GLUCOSE SERPL-MCNC: 87 MG/DL (ref 70–99)
POTASSIUM SERPL-SCNC: 4.8 MMOL/L (ref 3.4–5.3)
SODIUM SERPL-SCNC: 141 MMOL/L (ref 133–144)

## 2019-03-20 DIAGNOSIS — F51.01 PRIMARY INSOMNIA: ICD-10-CM

## 2019-03-20 RX ORDER — CLONAZEPAM 2 MG/1
TABLET ORAL
Qty: 30 TABLET | Refills: 0
Start: 2019-03-23 | End: 2019-04-23

## 2019-03-20 NOTE — TELEPHONE ENCOUNTER
Last visit 2/28/19  Last refill 2/21/19 - OK for refill 3/23/19  Rita Marvin RN  [facility]

## 2019-03-20 NOTE — TELEPHONE ENCOUNTER
Script for clonazepam phoned to [facility].  Rita Marvin RN  [facility]

## 2019-04-23 DIAGNOSIS — F51.01 PRIMARY INSOMNIA: ICD-10-CM

## 2019-04-23 RX ORDER — CLONAZEPAM 2 MG/1
TABLET ORAL
Qty: 30 TABLET | Refills: 1
Start: 2019-04-23 | End: 2019-06-25

## 2019-04-24 NOTE — TELEPHONE ENCOUNTER
Script for clonazepam phoned to [facility].  Rita Marvin RN  [facility]

## 2019-06-24 DIAGNOSIS — F51.01 PRIMARY INSOMNIA: ICD-10-CM

## 2019-06-24 NOTE — TELEPHONE ENCOUNTER
Last visit 2/28/19  Last refill 5/22/19  To Dr Bowman in absence of Dr Pipo Marvin RN  [facility]

## 2019-06-25 RX ORDER — CLONAZEPAM 2 MG/1
TABLET ORAL
Qty: 30 TABLET | Refills: 1
Start: 2019-06-25 | End: 2019-08-17

## 2019-08-17 DIAGNOSIS — F51.01 PRIMARY INSOMNIA: ICD-10-CM

## 2019-08-19 RX ORDER — CLONAZEPAM 2 MG/1
TABLET ORAL
Qty: 30 TABLET | Refills: 1
Start: 2019-08-23 | End: 2019-10-16

## 2019-08-19 NOTE — TELEPHONE ENCOUNTER
Last seen 2/28/19    Last filled for # 30 with 1 additional refill on 6/25/19    OK to refill with future fill date of 8/23/19    Routing refill request to provider for review/approval because:  Drug not on the [facility] refill protocol     Georgie Whitley RN  August 19, 2019 9:16 AM

## 2019-08-23 ENCOUNTER — TELEPHONE (OUTPATIENT)
Dept: FAMILY MEDICINE | Facility: CLINIC | Age: 79
End: 2019-08-23

## 2019-08-23 NOTE — TELEPHONE ENCOUNTER
[facility]    Phone Message    May a detailed message be left on voicemail: yes    Reason for Call: Other: PT is requesting a call back from a nurse to possibly suggest something over the counter for a cold he has.  Please follow up at [phone]     Action Taken: Message routed to:  [facility]: MIRNA

## 2019-08-23 NOTE — TELEPHONE ENCOUNTER
Spoke to pt - states he has felt chilled and has had slight sore throat and runny nose, slight cough in past 2 days. Has not checked his temp. Is asking for OTC med advice. Discussed use of tylenol prn for fever and chills; if his symptoms increase and are more bothersome, he should make clinic appt. Advised he could speak to pharmacist about symptoms, and get advice for symptoms about OTC meds - pt agrees with plan.  He will call back prn.  Rita Marvin RN  [facility]

## 2019-08-26 ENCOUNTER — TRANSFERRED RECORDS (OUTPATIENT)
Dept: HEALTH INFORMATION MANAGEMENT | Facility: CLINIC | Age: 79
End: 2019-08-26

## 2019-09-19 ENCOUNTER — OFFICE VISIT (OUTPATIENT)
Dept: FAMILY MEDICINE | Facility: CLINIC | Age: 79
End: 2019-09-19
Payer: COMMERCIAL

## 2019-09-19 VITALS
OXYGEN SATURATION: 96 % | WEIGHT: 170.75 LBS | RESPIRATION RATE: 16 BRPM | BODY MASS INDEX: 25.29 KG/M2 | DIASTOLIC BLOOD PRESSURE: 71 MMHG | TEMPERATURE: 97.7 F | SYSTOLIC BLOOD PRESSURE: 112 MMHG | HEIGHT: 69 IN | HEART RATE: 82 BPM

## 2019-09-19 DIAGNOSIS — J32.4 CHRONIC PANSINUSITIS: Primary | ICD-10-CM

## 2019-09-19 RX ORDER — GUAIFENESIN 600 MG/1
1200 TABLET, EXTENDED RELEASE ORAL 2 TIMES DAILY
COMMUNITY
End: 2021-10-14

## 2019-09-19 RX ORDER — AMOXICILLIN 875 MG
875 TABLET ORAL 2 TIMES DAILY
Qty: 28 TABLET | Refills: 0 | Status: SHIPPED | OUTPATIENT
Start: 2019-09-19 | End: 2020-02-18

## 2019-09-19 ASSESSMENT — MIFFLIN-ST. JEOR: SCORE: 1474.51

## 2019-09-19 NOTE — NURSING NOTE
"78 year old  Chief Complaint   Patient presents with     Cough     cough, congestion, sputum, fatigue, hoarse voice, x 1 month intermittently       Blood pressure 112/71, pulse 82, temperature 97.7  F (36.5  C), temperature source Oral, resp. rate 16, height 1.744 m (5' 8.66\"), weight 77.5 kg (170 lb 12 oz), SpO2 96 %. Body mass index is 25.46 kg/m .  Patient Active Problem List   Diagnosis     Preventative health care     Prostatic hyperplasia     Raynaud's syndrome     Persistent insomnia     Hypotestosteronism     Essential hypertension       Wt Readings from Last 2 Encounters:   19 77.5 kg (170 lb 12 oz)   19 76.9 kg (169 lb 8 oz)     BP Readings from Last 3 Encounters:   19 112/71   19 118/75   03/15/18 120/76         Current Outpatient Medications   Medication     amLODIPine (NORVASC) 5 MG tablet     Cholecalciferol (VITAMIN D) 1000 UNITS capsule     clonazePAM (KLONOPIN) 2 MG tablet     Doxylamine Succinate, Sleep, (SLEEP AID PO)     guaiFENesin (MUCINEX) 600 MG 12 hr tablet     HERBALS     lisinopril (PRINIVIL/ZESTRIL) 5 MG tablet     testosterone (ANDROGEL 1%) 25 MG/2.5GM (1%) gel     UNABLE TO FIND     UNABLE TO FIND     sildenafil (VIAGRA) 50 MG tablet     tadalafil (CIALIS) 5 MG tablet     No current facility-administered medications for this visit.        Social History     Tobacco Use     Smoking status: Former Smoker     Last attempt to quit: 1988     Years since quittin.7     Smokeless tobacco: Never Used     Tobacco comment: 1/2 - 1 PPD over 30 years,    Substance Use Topics     Alcohol use: Yes     Binge frequency: Less than monthly     Drug use: No       Health Maintenance Due   Topic Date Due     ADVANCE CARE PLANNING  1940     INFLUENZA VACCINE (1) 2019       No results found for: PAP      2019 11:32 AM    "

## 2019-09-19 NOTE — PROGRESS NOTES
"CHIEF COMPLAINT: Cough      HISTORY OF PRESENT ILLNESS: Joe Lerner is a 78 year old male who presents for evaluation of an intermittent cough with congestion, post-nasal drip, fatigue and a hoarse voice for the past month. He does not have a headache or sinus pressure. No fevers or chills. He has tried Mucinex with mild improvement.       FAMILY, SOCIAL AND PAST SURGICAL HISTORIES:  Unchanged.       MEDICATIONS:  Carefully reviewed.       REVIEW OF SYSTEMS:  A 10-point review is negative except as otherwise noted.      OBJECTIVE:    GENERAL: Joe is in no distress.  Affect is upbeat.     VITAL SIGNS: /71 (BP Location: Left arm, Patient Position: Sitting, Cuff Size: Adult Regular)   Pulse 82   Temp 97.7  F (36.5  C) (Oral)   Resp 16   Ht 1.744 m (5' 8.66\")   Wt 77.5 kg (170 lb 12 oz)   SpO2 96%   BMI 25.46 kg/m    HEENT:  Head is normocephalic, atraumatic. Ears clear bilaterally. No TM perforation. No pain with palpation of the frontal or maxillary sinuses.  Eyes are grossly normal. Throat looks benign. Neck is supple without adenopathy or thyromegaly.   LUNGS:  Clear to auscultation bilaterally.   HEART:  Regular rate and rhythm without murmur.       LABORATORY DATA: none necessary today      ASSESSMENT AND PLAN:   1. Chronic pansinusitis: Prescribed amoxicillin 875 mg BID for 2 weeks.   2. If not 80-90% better in 1-2 weeks, will have him come in for blood work.     Call with any problems or questions.     I, Juwan Diaz, am serving as a scribe to document services personally performed by Dr. Van Foss, based on data collection and the provider's statements to me. Dr. Foss has reviewed, edited, and approved the above note.     --Van Foss MD      "

## 2019-10-16 DIAGNOSIS — F51.01 PRIMARY INSOMNIA: ICD-10-CM

## 2019-10-16 RX ORDER — CLONAZEPAM 2 MG/1
TABLET ORAL
Qty: 30 TABLET | Refills: 1 | Status: SHIPPED | OUTPATIENT
Start: 2019-10-20 | End: 2019-12-13

## 2019-10-16 NOTE — TELEPHONE ENCOUNTER
Pt last seen 9/19/19,  Last filled for 30 pills on 9/21/19, can not refill until Oct 20th per once every 30 day rules.    Correct per .    OK to refill for future date of 10/20/19    Routing refill request to provider for review/approval because:  Drug not on the [facility] refill protocol     Georgie Whitley RN  October 16, 2019 4:03 PM

## 2019-11-25 ENCOUNTER — TRANSFERRED RECORDS (OUTPATIENT)
Dept: HEALTH INFORMATION MANAGEMENT | Facility: CLINIC | Age: 79
End: 2019-11-25

## 2019-11-26 ENCOUNTER — TRANSFERRED RECORDS (OUTPATIENT)
Dept: HEALTH INFORMATION MANAGEMENT | Facility: CLINIC | Age: 79
End: 2019-11-26

## 2019-12-13 ENCOUNTER — TRANSFERRED RECORDS (OUTPATIENT)
Dept: HEALTH INFORMATION MANAGEMENT | Facility: CLINIC | Age: 79
End: 2019-12-13
Payer: COMMERCIAL

## 2019-12-13 ENCOUNTER — TELEPHONE (OUTPATIENT)
Dept: FAMILY MEDICINE | Facility: CLINIC | Age: 79
End: 2019-12-13

## 2019-12-13 DIAGNOSIS — F51.01 PRIMARY INSOMNIA: ICD-10-CM

## 2019-12-13 NOTE — TELEPHONE ENCOUNTER
Called patient and LVM - this is the first request we have received for refill of medication. It is due for fill on 12/19/19 and will get it ready for that date.     Zully Ruby RN  12/13/19  10:54 AM

## 2019-12-13 NOTE — TELEPHONE ENCOUNTER
"Last Office Visit: 2/28/19 w/ Dr. Bowman - discussed benzo use  Future [facility] Appointments: 3/18/19 w/ Dr. Foss  Medication last refilled: 11/18/19 - 1 day early Patient consistently gets his fills day/days early even though Rx lists \"must last 30 days between refills\"        Must last 30 days between fills. DUE 12/19/19    Routing refill request to provider for review/approval because:Drug not on the [facility] refill protocol     Zully Ruby RN    "

## 2019-12-13 NOTE — TELEPHONE ENCOUNTER
Health Call Center    Phone Message    May a detailed message be left on voicemail: yes    Reason for Call: Other: Patient called he said the pharmacy  requested a script 3 times  no response from us. Patient said he need Dr. Foss to respond with the script for ClonazePam to [facility] at [address]     Action Taken: Other: [facility]

## 2019-12-18 RX ORDER — CLONAZEPAM 2 MG/1
2 TABLET ORAL
Qty: 30 TABLET | Refills: 0 | Status: SHIPPED | OUTPATIENT
Start: 2019-12-19 | End: 2020-01-15

## 2020-01-15 DIAGNOSIS — F51.01 PRIMARY INSOMNIA: ICD-10-CM

## 2020-01-15 RX ORDER — CLONAZEPAM 2 MG/1
2 TABLET ORAL
Qty: 30 TABLET | Refills: 0 | Status: SHIPPED | OUTPATIENT
Start: 2020-01-17 | End: 2020-02-18

## 2020-01-15 NOTE — TELEPHONE ENCOUNTER
Last seen 9/19/19,  Future 3/18/20    Last filled for # 30 on 12/19/19 per   Must last 30 days       On time for refill with future fill date of 1/17/20      [place] reviewed:  reviewed- no concerns    [place]  [email or web address]    Routing refill request to provider for review/approval because:  Drug not on the [facility] refill protocol       Georgie Whitley RN  January 15, 2020 5:04 PM

## 2020-02-18 DIAGNOSIS — F51.01 PRIMARY INSOMNIA: ICD-10-CM

## 2020-02-18 RX ORDER — CLONAZEPAM 2 MG/1
2 TABLET ORAL
Qty: 30 TABLET | Refills: 0 | Status: SHIPPED | OUTPATIENT
Start: 2020-02-19 | End: 2020-03-16

## 2020-02-18 NOTE — TELEPHONE ENCOUNTER
Last visit 9/19/19  Last refill 1/20/20 - checked  - OK for refill 2/19/20  Rita Marvin RN  [facility]

## 2020-03-13 ENCOUNTER — TRANSFERRED RECORDS (OUTPATIENT)
Dept: HEALTH INFORMATION MANAGEMENT | Facility: CLINIC | Age: 80
End: 2020-03-13

## 2020-03-13 LAB — RETINOPATHY: NORMAL

## 2020-03-16 DIAGNOSIS — F51.01 PRIMARY INSOMNIA: ICD-10-CM

## 2020-03-16 RX ORDER — CLONAZEPAM 2 MG/1
2 TABLET ORAL
Qty: 30 TABLET | Refills: 0 | Status: SHIPPED | OUTPATIENT
Start: 2020-03-16 | End: 2020-04-17

## 2020-03-16 NOTE — TELEPHONE ENCOUNTER
Last seen 9/19/19, next appt 3/18/20.      Last filled for # 30 on 2/18/20 per     [place] reviewed:  reviewed- no concerns    [place]  [email or web address]    Will cue up for refill today so pt can pick it up.  OK per Pipo, in light of COVID19 issue today.      Routing refill request to provider for review/approval because:  Drug not on the [facility] refill protocol       Georgie Whitley RN  March 16, 2020 1:08 PM      \

## 2020-04-09 DIAGNOSIS — I10 ESSENTIAL HYPERTENSION: Chronic | ICD-10-CM

## 2020-04-10 RX ORDER — LISINOPRIL 5 MG/1
TABLET ORAL
Qty: 90 TABLET | Refills: 0 | Status: SHIPPED | OUTPATIENT
Start: 2020-04-10 | End: 2020-08-11

## 2020-04-10 NOTE — TELEPHONE ENCOUNTER
Last Office Visit: 9/19/19 for acute problem  Future [facility] Appointments: none  Medication last refilled: 2/28/19 #90 +3 refills    Patient due for new labs - orders are pending.     COVID-19, 90-day refill authorization per Medical Director override.    Zully Ruby RN  04/10/20  10:34 AM

## 2020-04-15 DIAGNOSIS — I10 ESSENTIAL HYPERTENSION: Chronic | ICD-10-CM

## 2020-04-16 RX ORDER — AMLODIPINE BESYLATE 5 MG/1
TABLET ORAL
Qty: 90 TABLET | Refills: 1 | Status: SHIPPED | OUTPATIENT
Start: 2020-04-16 | End: 2020-08-19

## 2020-04-16 NOTE — TELEPHONE ENCOUNTER
Last visit 9/19/19, no future visit    Medication is being filled for 1 time refill only due to:  Patient needs labs BMP - already in chart.   Rita Marvin RN  [facility]

## 2020-04-17 DIAGNOSIS — F51.01 PRIMARY INSOMNIA: ICD-10-CM

## 2020-04-17 RX ORDER — CLONAZEPAM 2 MG/1
2 TABLET ORAL
Qty: 30 TABLET | Refills: 0 | Status: SHIPPED | OUTPATIENT
Start: 2020-04-19 | End: 2020-05-19

## 2020-04-17 NOTE — TELEPHONE ENCOUNTER
Last Office Visit: acute problem on 10/16/19  Future [facility] Appointments: none  Medication last refilled: per  review 3/20/20    DUE 4/19/20    Routing refill request to provider for review/approval because: Drug not on the [facility] refill protocol     Zully Ruby RN  04/17/20  11:12 AM

## 2020-05-19 DIAGNOSIS — F51.01 PRIMARY INSOMNIA: ICD-10-CM

## 2020-05-19 RX ORDER — CLONAZEPAM 2 MG/1
2 TABLET ORAL
Qty: 30 TABLET | Refills: 0 | Status: SHIPPED | OUTPATIENT
Start: 2020-05-19 | End: 2020-06-18

## 2020-06-08 RX ORDER — SILDENAFIL 50 MG/1
100 TABLET, FILM COATED ORAL DAILY PRN
Status: CANCELLED | OUTPATIENT
Start: 2020-06-08

## 2020-06-08 NOTE — TELEPHONE ENCOUNTER
Health Call Center    Phone Message    May a detailed message be left on voicemail: yes     Reason for Call: Medication Refill Request    Has the patient contacted the pharmacy for the refill? Yes   Name of medication being requested: Viagra  Provider who prescribed the medication: PCP  Pharmacy:   [facility] [id number] [place] [facility] [place] [place] - [facility] [address] [phone] (Phone)  [phone] (Fax)     Allergies        No Known Allergies     Abuse Screen from encounters over the past 365 days    No data recorded     Reminders and Results    None        Relevant Encounters (Maximum of 5 visits)    Date Type Department Provider Description   12/13/2019 Telephone [facility] Van Foss MD, MD Patient Request   09/19/2019 Office Visit [facility] Van Foss MD, MD Chronic pansinusitis (Primary Dx)   08/23/2019 Telephone [facility] Van Foss MD, MD Call Back (rx for PT's cold)   02/28/2019 Office Visit [facility] Esteban Bowman MD Medicare annual wellness visit, subsequent (Primary Dx); Es...   03/15/2018 Office Visit [facility] Van Foss MD, MD Pre-op evaluation (Primary Dx)             Specialty Comments Report   Dept Specialty: Family Practice  No Dexa Scan was found.  Needs BP check and BMP before more refills-9/14        Health Maintenance     1940  ADVANCE CARE PLANNING      01/01/2020  PHQ-2     02/28/2020  MEDICARE ANNUAL WELLNESS VISIT      02/28/2020  FALL RISK ASSESSMENT       02/27/2023  LIPID       05/10/2023  DTAP/TDAP/TD IMMUNIZATION (3 - Td)       Social History Collapse by Default   Collapse by Default        Smoking Status Former Smoker; Quit date: 1988   Smokeless Tobacco Status Never Used   Alcohol Use Yes   Drug Use No   Expand to view 4 items    Immunizations/Injections    Influenza (H1N1)12/17/2009    Influenza (High Dose) 3 valent sylppiq25/12/2018, 9/12/2017, 9/9/2016    Influenza (IIV3) PF9/26/2013, 10/5/2011    Pneumo  Conj 13-V (2010&after)11/4/2015    Pneumococcal 23 valent7/7/2005    TD (ADULT, 7+)7/7/2005    TDAP Vaccine (Boostrix)5/10/2013    Zoster vaccine, live4/4/2017      Family Comments Edit   None     Medical History Collapse by Default   Collapse by Default        Date Unknown Enlarged prostate   Date Unknown Hypertension   Date Unknown Low testosterone   Expand to view 3 items    Surgical History Collapse by Default   Collapse by Default        Date Unknown Prostate surgery   Expand to view 1 items    CBC Lab Results (Last 5 results in 365 days)               Date medication is needed: asap         Action Taken: Message routed to:  [facility]: [place]    Travel Screening: Not Applicable

## 2020-06-09 NOTE — TELEPHONE ENCOUNTER
Last visit 9/19/19, future visit 8/26/20    Prescription approved per [facility] Refill Protocol.    Attempted to call pt - no answering machine - Dr Foss has not ordered this med for pt in the past - need more info.  Called pt a 2nd time 6/10 - able to leave message for him to call back re; viagra dosing.    Rita Marvin RN  [facility]

## 2020-06-10 NOTE — TELEPHONE ENCOUNTER
Patient calls back - he would like an order for Viagra. Advised him we have no record of ordering this from [facility] and offered a Virtual Visit with Dr. Foss to discuss new medication start. Patient declines appointment at this time, he will contact his Urologist for this new medication. Please call back to schedule if needs this new medication filled.     Zully Ruby RN  06/10/20  4:38 PM

## 2020-06-18 DIAGNOSIS — F51.01 PRIMARY INSOMNIA: ICD-10-CM

## 2020-06-18 RX ORDER — CLONAZEPAM 2 MG/1
2 TABLET ORAL
Qty: 30 TABLET | Refills: 0 | Status: SHIPPED | OUTPATIENT
Start: 2020-06-18 | End: 2020-07-21

## 2020-07-21 DIAGNOSIS — F51.01 PRIMARY INSOMNIA: ICD-10-CM

## 2020-07-21 RX ORDER — CLONAZEPAM 2 MG/1
2 TABLET ORAL
Qty: 30 TABLET | Refills: 0 | Status: SHIPPED | OUTPATIENT
Start: 2020-07-21 | End: 2020-08-20

## 2020-07-21 NOTE — TELEPHONE ENCOUNTER
Last Office Visit: 9/19/19 - acute problem  Future [facility] Appointments: 8/26/20  Medication last refilled: 6/19/20 - per  review    Routing refill request to provider for review/approval because: Drug not on the [facility] refill protocol     Zully Ruby RN  07/21/20  8:55 AM

## 2020-08-10 DIAGNOSIS — I10 ESSENTIAL HYPERTENSION: Chronic | ICD-10-CM

## 2020-08-11 RX ORDER — AMLODIPINE BESYLATE 5 MG/1
TABLET ORAL
Qty: 90 TABLET | Refills: 1 | OUTPATIENT
Start: 2020-08-11

## 2020-08-11 RX ORDER — LISINOPRIL 5 MG/1
5 TABLET ORAL DAILY
Qty: 90 TABLET | Refills: 0 | Status: SHIPPED | OUTPATIENT
Start: 2020-08-11 | End: 2020-09-15

## 2020-08-11 NOTE — TELEPHONE ENCOUNTER
Last time prescribed: 4/10/20 , 90 tabs/caps x 0 refills  Last office visit: 9/19/19  Next appointment: 8/26/20  Last labs: 8/16/19    Zully Ruby RN  08/11/20  11:28 AM

## 2020-08-11 NOTE — TELEPHONE ENCOUNTER
Last time prescribed: 4/16/20 , 90 tabs/caps x 1 refills  Last office visit: 9/19/19  Next appointment: 8/26/20  Last labs: 8/16/19    Patient has enough medication to see him thru mid-October. Has upcoming appointment.  No refill needed at this time, pharmacy notified.     Zully Ruby RN  08/11/20  3:54 PM

## 2020-08-18 DIAGNOSIS — I10 ESSENTIAL HYPERTENSION: Chronic | ICD-10-CM

## 2020-08-19 RX ORDER — AMLODIPINE BESYLATE 5 MG/1
TABLET ORAL
Qty: 90 TABLET | Refills: 0 | Status: SHIPPED | OUTPATIENT
Start: 2020-08-19 | End: 2020-11-13

## 2020-08-19 NOTE — TELEPHONE ENCOUNTER
Last visit 9/19/19, next visit 8/26/20.  Medication is being filled for 1 time refill only due to:  Patient needs to be seen because it has been more than one year since last visit.   Rita Marvin RN  [facility]

## 2020-08-20 DIAGNOSIS — F51.01 PRIMARY INSOMNIA: ICD-10-CM

## 2020-08-20 RX ORDER — CLONAZEPAM 2 MG/1
2 TABLET ORAL
Qty: 30 TABLET | Refills: 0 | Status: SHIPPED | OUTPATIENT
Start: 2020-08-20 | End: 2020-09-15

## 2020-08-20 NOTE — TELEPHONE ENCOUNTER
Last visit 9/19/20 - future visit 8/26/20  Last refill 7/21/20 - checked   Rita Marvin RN  [facility]

## 2020-08-26 ENCOUNTER — OFFICE VISIT (OUTPATIENT)
Dept: FAMILY MEDICINE | Facility: CLINIC | Age: 80
End: 2020-08-26
Payer: COMMERCIAL

## 2020-08-26 VITALS
TEMPERATURE: 97.3 F | WEIGHT: 168 LBS | HEART RATE: 71 BPM | OXYGEN SATURATION: 98 % | RESPIRATION RATE: 16 BRPM | DIASTOLIC BLOOD PRESSURE: 74 MMHG | HEIGHT: 68 IN | SYSTOLIC BLOOD PRESSURE: 117 MMHG | BODY MASS INDEX: 25.46 KG/M2

## 2020-08-26 DIAGNOSIS — Z13.220 SCREENING FOR LIPOID DISORDERS: ICD-10-CM

## 2020-08-26 DIAGNOSIS — Z00.00 MEDICARE ANNUAL WELLNESS VISIT, SUBSEQUENT: Primary | ICD-10-CM

## 2020-08-26 DIAGNOSIS — Z12.11 SCREENING FOR COLORECTAL CANCER: ICD-10-CM

## 2020-08-26 DIAGNOSIS — Z12.12 SCREENING FOR COLORECTAL CANCER: ICD-10-CM

## 2020-08-26 LAB
BUN SERPL-MCNC: 17 MG/DL (ref 7–30)
CALCIUM SERPL-MCNC: 9.2 MG/DL (ref 8.5–10.4)
CHLORIDE SERPLBLD-SCNC: 105 MMOL/L (ref 94–109)
CHOLEST SERPL-MCNC: 243 MG/DL (ref 0–200)
CHOLEST/HDLC SERPL: 4.2 {RATIO} (ref 0–5)
CO2 SERPL-SCNC: 28 MMOL/L (ref 20–32)
CREAT SERPL-MCNC: 1.2 MG/DL (ref 0.8–1.5)
EGFR CALCULATED (BLACK REFERENCE): 74.9
EGFR CALCULATED (NON BLACK REFERENCE): 61.9
FASTING SPECIMEN: YES
GLUCOSE SERPL-MCNC: 95 MG/DL (ref 60–99)
HDLC SERPL-MCNC: 58 MG/DL
LDLC SERPL CALC-MCNC: 158 MG/DL (ref 0–129)
POTASSIUM SERPL-SCNC: 4 MMOL/L (ref 3.4–5.3)
SODIUM SERPL-SCNC: 142 MMOL/L (ref 137.3–146.3)
TRIGL SERPL-MCNC: 134 MG/DL (ref 0–150)
VLDL-CHOLESTEROL: 27 (ref 7–32)

## 2020-08-26 SDOH — HEALTH STABILITY: MENTAL HEALTH: HOW MANY STANDARD DRINKS CONTAINING ALCOHOL DO YOU HAVE ON A TYPICAL DAY?: 5 OR 6

## 2020-08-26 SDOH — HEALTH STABILITY: MENTAL HEALTH: HOW OFTEN DO YOU HAVE A DRINK CONTAINING ALCOHOL?: MONTHLY OR LESS

## 2020-08-26 ASSESSMENT — MIFFLIN-ST. JEOR: SCORE: 1446.54

## 2020-08-26 NOTE — LETTER
"September 1, 2020      Joe Lerner  [address]  [facility] [address]        Dear Joe,     It was wonderful to see you this (Wednesday) afternoon!  Here's a copy of you lab results.     Overall, your lipid (cholesterol) panel looks quite good.     Your total is a bit elevated, mostly due to your LDL cholesterol level being elevated.  That being said, your HDL (\"good\")cholesterol level has never been higher or better, and your triglycerides (\"fats\") are the lowest and best they've been in years.  Nice!     Your basic metabolic panel (which measures your glucose or sugar level, calcium, kidney function tests, and electrolytes) was completely normal, top to bottom.     Take care!     Van Rojas   Lipid Panel ([place])   Result Value Ref Range    FASTING SPECIMEN yes     Cholesterol 243.0 (H) 0.0 - 200.0    HDL Cholesterol 58.0 >40.0    Triglycerides 134.0 0.0 - 150.0    Cholesterol/HDL Ratio 4.2 0.0 - 5.0    LDL Cholesterol Direct 158.0 (H) 0.0 - 129.0    VLDL-Cholesterol 27.0 7.0 - 32.0   Basic Metabolic Panel ([place])   Result Value Ref Range    Glucose 95.0 60.0 - 99.0 mg/dL    Urea Nitrogen 17.0 7.0 - 30.0 mg/dL    Calcium 9.2 8.5 - 10.4 mg/dL    Creatinine 1.2 0.8 - 1.5 mg/dL    eGFR Calculated (Non Black Reference) 61.9 >60.0    eGFR Calculated (Black Reference) 74.9 >60.0    Sodium 142.0 137.3 - 146.3 mmol/L    Potassium 4.0 3.4 - 5.3 mmol/L    Chloride 105.0 94.0 - 109.0 mmol/L    Carbon Dioxide 28.0 20.0 - 32.0 mmol/L                     "

## 2020-08-26 NOTE — NURSING NOTE
"79 year old  Chief Complaint   Patient presents with     Physical     no concerns, would like labs and fit card        Blood pressure 117/74, pulse 71, temperature 97.3  F (36.3  C), temperature source Skin, resp. rate 16, height 1.727 m (5' 8\"), weight 76.2 kg (168 lb), SpO2 98 %. Body mass index is 25.54 kg/m .  Patient Active Problem List   Diagnosis     Preventative health care     Prostatic hyperplasia     Raynaud's syndrome     Persistent insomnia     Hypotestosteronism     Essential hypertension       Wt Readings from Last 2 Encounters:   20 76.2 kg (168 lb)   19 77.5 kg (170 lb 12 oz)     BP Readings from Last 3 Encounters:   20 117/74   19 112/71   19 118/75         Current Outpatient Medications   Medication     amLODIPine (NORVASC) 5 MG tablet     Cholecalciferol (VITAMIN D) 1000 UNITS capsule     clonazePAM (KLONOPIN) 2 MG tablet     Doxylamine Succinate, Sleep, (SLEEP AID PO)     guaiFENesin (MUCINEX) 600 MG 12 hr tablet     HERBALS     lisinopril (ZESTRIL) 5 MG tablet     sildenafil (VIAGRA) 50 MG tablet     tadalafil (CIALIS) 5 MG tablet     testosterone (ANDROGEL 1%) 25 MG/2.5GM (1%) gel     UNABLE TO FIND     UNABLE TO FIND     No current facility-administered medications for this visit.        Social History     Tobacco Use     Smoking status: Former Smoker     Last attempt to quit:      Years since quittin.6     Smokeless tobacco: Never Used     Tobacco comment: 1/2 - 1 PPD over 30 years,    Substance Use Topics     Alcohol use: Yes     Frequency: Monthly or less     Drinks per session: 5 or 6     Binge frequency: Less than monthly     Drug use: No       Health Maintenance Due   Topic Date Due     ADVANCE CARE PLANNING  1940     PHQ-2  2020     MEDICARE ANNUAL WELLNESS VISIT  2020     FALL RISK ASSESSMENT  2020     INFLUENZA VACCINE (1) 2020       No results found for: PAP      2020 1:42 PM    "

## 2020-08-27 PROBLEM — Z00.00 MEDICARE ANNUAL WELLNESS VISIT, SUBSEQUENT: Status: ACTIVE | Noted: 2020-08-27

## 2020-08-27 NOTE — PROGRESS NOTES
"CHIEF COMPLAINT:  Medicare annual wellness visit, subsequent.      HISTORY OF PRESENT ILLNESS:  Joe is an 79-year-old who is here for the above.  He is in excellent health.  He has not been sick during the pandemic.      He has some prostatic hyperplasia and hypotestosteronism.  He is followed by Urology and his PSA typically is around 13.  We will not check that today, as his urologist will do that with a subsequent followup visit.      He has a history of Raynaud syndrome.  He has done well on amlodipine 5 mg daily.      SOCIAL, FAMILY AND PAST SURGICAL HISTORIES:  Unchanged.      MEDICARE QUESTIONS:  From an activities of daily living standpoint, he is doing fine.  He feels \"older.\"  He has flat feet, which has given him some knee pain and osteoarthritis.  He knows he is a little bowlegged.\"  He had no falls at home.  No symptoms of depression.  No memory loss.      HEALTHCARE MAINTENANCE:  He wears glasses and sees an ophthalmologist generally every year.  He has had cataract surgery.  His hearing is fine.  He will get some wax in his left ear and often goes to ENT to have it taken out.  Dental care is up-to-date.  He goes every 6 months.  Blood pressure 117/74 on a combination of lisinopril and amlodipine.  Body mass index is 25.54.  From an immunization standpoint, he is up-to-date and he already had his flu shot at [facility].  Urology will check his PSA.  He is seeing an endocrinologist for his testosterone.  Up-to-date with colon cancer screening and we discussed doing a FIT card.      OBJECTIVE:  Joe is in absolutely no distress.  His affect is upbeat.  Alert and oriented x3.  He is wearing a mask.  BP is 117/74 with a heart rate of 71 beats per minute and regular.  Temperature is 97.3.  He is 5 feet 8 inches and weighs 168 pounds with a BMI of 25.54.  O2 sats are 98% on room air.     HEENT:  Head is normocephalic, atraumatic.  Ears are free of any cerumen.  There is no pain with palpation of the frontal or " maxillary sinuses.  Eyes are grossly normal.   NECK:  Supple without adenopathy or thyromegaly.  No carotid bruits are heard.   BACK:  Smooth and straight.   LUNGS:  Clear to auscultation bilaterally.   HEART:  Regular rate and rhythm without murmur.   EXTREMITIES:  Ankles are free of any edema.      LABORATORY DATA:  Lipid panel and basic metabolic panels are pending.  FIT card was given to him.      ASSESSMENT AND PLAN:   1.  Medicare annual wellness visit, subsequent, up-to-date with healthcare maintenance strategies.   2.  Screening for screening for hyperlipidemia with a lipid panel.   3.  History of hypertension.  Basic metabolic panel pending.  Continue on both amlodipine and lisinopril.

## 2020-09-10 ENCOUNTER — TRANSFERRED RECORDS (OUTPATIENT)
Dept: HEALTH INFORMATION MANAGEMENT | Facility: CLINIC | Age: 80
End: 2020-09-10

## 2020-09-14 DIAGNOSIS — I10 ESSENTIAL HYPERTENSION: Chronic | ICD-10-CM

## 2020-09-14 NOTE — TELEPHONE ENCOUNTER
Last time prescribed: 8/11/2020 , 90 tabs x 0 refills  Last office visit: 8/26/2020  Next appointment: No future appointments    Prescription approved per [facility] Refill Protocol.      Georgie Whitley RN  September 15, 2020 2:48 PM

## 2020-09-15 DIAGNOSIS — F51.01 PRIMARY INSOMNIA: ICD-10-CM

## 2020-09-15 RX ORDER — CLONAZEPAM 2 MG/1
2 TABLET ORAL
Qty: 30 TABLET | Refills: 0 | Status: SHIPPED | OUTPATIENT
Start: 2020-09-18 | End: 2020-10-20

## 2020-09-15 RX ORDER — LISINOPRIL 5 MG/1
5 TABLET ORAL DAILY
Qty: 90 TABLET | Refills: 3 | Status: SHIPPED | OUTPATIENT
Start: 2020-09-15 | End: 2021-09-14

## 2020-09-15 NOTE — TELEPHONE ENCOUNTER
Last seen 8/26/20,  No future appt     Last filled 8/20/20 for # 30 per   Too soon to fill.     Will cue up for 9/18/20  For every 30 days     RX [place] reviewed:  reviewed- no concerns    [place]:  [email or web address]    Routing refill request to provider for review/approval because:  Drug not on the [facility] refill protocol     Georgie Whitley RN  September 15, 2020 1:43 PM

## 2020-10-05 DIAGNOSIS — Z12.11 SCREENING FOR COLORECTAL CANCER: ICD-10-CM

## 2020-10-05 DIAGNOSIS — Z12.12 SCREENING FOR COLORECTAL CANCER: ICD-10-CM

## 2020-10-05 PROCEDURE — 82274 ASSAY TEST FOR BLOOD FECAL: CPT | Performed by: FAMILY MEDICINE

## 2020-10-07 LAB — HEMOCCULT STL QL IA: NEGATIVE

## 2020-10-19 DIAGNOSIS — F51.01 PRIMARY INSOMNIA: ICD-10-CM

## 2020-10-20 RX ORDER — CLONAZEPAM 2 MG/1
2 TABLET ORAL
Qty: 30 TABLET | Refills: 0 | Status: SHIPPED | OUTPATIENT
Start: 2020-10-20 | End: 2020-11-19

## 2020-11-12 DIAGNOSIS — I10 ESSENTIAL HYPERTENSION: Chronic | ICD-10-CM

## 2020-11-13 RX ORDER — AMLODIPINE BESYLATE 5 MG/1
TABLET ORAL
Qty: 90 TABLET | Refills: 3 | Status: SHIPPED | OUTPATIENT
Start: 2020-11-13 | End: 2021-12-15

## 2020-11-19 DIAGNOSIS — F51.01 PRIMARY INSOMNIA: ICD-10-CM

## 2020-11-19 RX ORDER — CLONAZEPAM 2 MG/1
2 TABLET ORAL
Qty: 30 TABLET | Refills: 0 | Status: SHIPPED | OUTPATIENT
Start: 2020-11-19 | End: 2020-12-17

## 2020-11-19 NOTE — TELEPHONE ENCOUNTER
Last visit 8/24/20  Last refill 10/20/20 - checked   Rita Marvin RN  [facility]

## 2020-12-17 DIAGNOSIS — F51.01 PRIMARY INSOMNIA: ICD-10-CM

## 2020-12-17 RX ORDER — CLONAZEPAM 2 MG/1
2 TABLET ORAL
Qty: 30 TABLET | Refills: 0 | Status: SHIPPED | OUTPATIENT
Start: 2020-12-19 | End: 2021-01-20

## 2020-12-17 NOTE — TELEPHONE ENCOUNTER
Last visit 8/26/20  Last refill 11/19/20 - checked  - OK for refill 12/19/20.  Rita Marvin RN  [facility]

## 2021-01-18 DIAGNOSIS — F51.01 PRIMARY INSOMNIA: ICD-10-CM

## 2021-01-20 RX ORDER — CLONAZEPAM 2 MG/1
2 TABLET ORAL
Qty: 30 TABLET | Refills: 1 | Status: SHIPPED | OUTPATIENT
Start: 2021-01-20 | End: 2021-03-18

## 2021-01-20 NOTE — TELEPHONE ENCOUNTER
Agreed to one time refill during Dr. Foss's absence.    Joe was seen today for refill request.    Diagnoses and all orders for this visit:    Primary insomnia  -     clonazePAM (KLONOPIN) 2 MG tablet; Take 1 tablet (2 mg) by mouth nightly as needed for sleep MUST LAST 30 DAYS BETWEEN REFILLS      Sheldon Kenyon MD  1:55 PM, January 20, 2021

## 2021-03-15 ENCOUNTER — TRANSFERRED RECORDS (OUTPATIENT)
Dept: HEALTH INFORMATION MANAGEMENT | Facility: CLINIC | Age: 81
End: 2021-03-15

## 2021-03-18 DIAGNOSIS — F51.01 PRIMARY INSOMNIA: ICD-10-CM

## 2021-03-18 NOTE — TELEPHONE ENCOUNTER
Last visit 8/26/20  Last refill 2/20/21 - checked  - OK for refill 3/22/21  Rita Marvin RN  [facility]

## 2021-03-19 RX ORDER — CLONAZEPAM 2 MG/1
2 TABLET ORAL
Qty: 30 TABLET | Refills: 1 | Status: SHIPPED | OUTPATIENT
Start: 2021-03-22 | End: 2021-05-20

## 2021-05-20 DIAGNOSIS — F51.01 PRIMARY INSOMNIA: ICD-10-CM

## 2021-05-20 RX ORDER — CLONAZEPAM 2 MG/1
2 TABLET ORAL
Qty: 30 TABLET | Refills: 1 | Status: SHIPPED | OUTPATIENT
Start: 2021-05-20 | End: 2021-07-13

## 2021-05-20 NOTE — TELEPHONE ENCOUNTER
Patient states he will be out of his medication by tomorrow (5/21). Requests the refill as soon as possible.       Already approved earlier today.      Georgie Whitley RN  May 20, 2021 4:04 PM

## 2021-05-20 NOTE — TELEPHONE ENCOUNTER
Last Office Visit: 8/26/20  Future [facility] Appointments: None  Medication last refilled: 4/19/21 for # 60 per   ( as they are giving pt  1 mg tabs, for sig saying to take one 2 mg tab prn daily ) -hence why they are giving 60 tabs of the 1 mg pills, when we are ordering 30 tabs of the 2 mg pills.      RX monitoring program ([place]) reviewed:  reviewed- no concerns    [place]:  [email or web address]    Routing refill request to provider for review/approval because:  Drug not on the [facility] refill protocol     Georgie Whitley, RN  May 20, 2021 12:27 PM

## 2021-07-13 DIAGNOSIS — F51.01 PRIMARY INSOMNIA: ICD-10-CM

## 2021-07-13 RX ORDER — CLONAZEPAM 2 MG/1
2 TABLET ORAL
Qty: 30 TABLET | Refills: 1 | Status: SHIPPED | OUTPATIENT
Start: 2021-07-13 | End: 2021-09-13

## 2021-07-13 NOTE — TELEPHONE ENCOUNTER
Last Office Visit: 8/26/20  Future [facility] Appointments: 10/5/21  Medication last refilled: 5/20/21 #30 with 1 refill     verified - last fill date: 6/17/21    Routing refill request to provider for review/approval because:  Drug not on the [facility] refill protocol     Jaye Knutson RN, BSN

## 2021-09-13 DIAGNOSIS — I10 ESSENTIAL HYPERTENSION: Chronic | ICD-10-CM

## 2021-09-13 DIAGNOSIS — F51.01 PRIMARY INSOMNIA: ICD-10-CM

## 2021-09-13 NOTE — TELEPHONE ENCOUNTER
Last time prescribed: 9/15/2020 , 90 tabs x 3 refills  Last office visit: 8/26/2020  Next appointment: 10/5/2021

## 2021-09-13 NOTE — TELEPHONE ENCOUNTER
Medication requested: clonazePAM (KLONOPIN) 2 MG tablet  Last office visit: 8/26/20  [facility] appointments: 10/5/21  Medication last refilled:  reviewed - patient getting filled early over past couple months, even though sig states must last 30 days between refills. Next refill is due 9/19/21  Zully Ruby MS RN-BC  09/13/21  11:16 AM

## 2021-09-14 RX ORDER — LISINOPRIL 5 MG/1
5 TABLET ORAL DAILY
Qty: 90 TABLET | Refills: 0 | Status: SHIPPED | OUTPATIENT
Start: 2021-09-14 | End: 2021-12-15

## 2021-09-14 NOTE — TELEPHONE ENCOUNTER
Lisinopril (Zestril) 5 mg    Last Office Visit: 8/26/20  [facility] [place]ointments: 10/5/21  Medication last refilled: 9/15/20 #90 with 3 refill(s)    Vital Signs 2/28/2019 9/19/2019 8/26/2020   Systolic 118 112 117   Diastolic 75 71 74     Required labs per protocol:     Ref Range & Units 8/16/19 8/26/20   Potassium 3.4 - 5.3 mmol/L 4.8 4.0   Creatinine 0.8 - 1.5 mg/dL  1.29High     1.2        Prescription approved per [facility] Refill Protocol.    Jaye Knutson, RN, BSN

## 2021-09-17 RX ORDER — CLONAZEPAM 2 MG/1
2 TABLET ORAL
Qty: 30 TABLET | Refills: 0 | Status: SHIPPED | OUTPATIENT
Start: 2021-09-19 | End: 2021-10-14

## 2021-09-17 NOTE — TELEPHONE ENCOUNTER
Who is calling? Patient  Reason for Call: Out of medication, requesting refill as soon as possible.

## 2021-10-05 ENCOUNTER — OFFICE VISIT (OUTPATIENT)
Dept: FAMILY MEDICINE | Facility: CLINIC | Age: 81
End: 2021-10-05
Payer: COMMERCIAL

## 2021-10-05 VITALS
RESPIRATION RATE: 15 BRPM | SYSTOLIC BLOOD PRESSURE: 112 MMHG | OXYGEN SATURATION: 97 % | HEART RATE: 73 BPM | WEIGHT: 173.5 LBS | BODY MASS INDEX: 26.3 KG/M2 | TEMPERATURE: 97.1 F | HEIGHT: 68 IN | DIASTOLIC BLOOD PRESSURE: 70 MMHG

## 2021-10-05 DIAGNOSIS — M65.331 TRIGGER MIDDLE FINGER OF RIGHT HAND: ICD-10-CM

## 2021-10-05 DIAGNOSIS — I10 ESSENTIAL HYPERTENSION: ICD-10-CM

## 2021-10-05 DIAGNOSIS — N40.0 PROSTATIC HYPERPLASIA: ICD-10-CM

## 2021-10-05 DIAGNOSIS — Z12.11 SCREENING FOR COLORECTAL CANCER: ICD-10-CM

## 2021-10-05 DIAGNOSIS — Z13.220 SCREENING FOR HYPERLIPIDEMIA: ICD-10-CM

## 2021-10-05 DIAGNOSIS — Z23 NEED FOR IMMUNIZATION AGAINST INFLUENZA: ICD-10-CM

## 2021-10-05 DIAGNOSIS — Z12.12 SCREENING FOR COLORECTAL CANCER: ICD-10-CM

## 2021-10-05 DIAGNOSIS — Z00.00 MEDICARE ANNUAL WELLNESS VISIT, SUBSEQUENT: Primary | ICD-10-CM

## 2021-10-05 LAB
ANION GAP SERPL CALCULATED.3IONS-SCNC: 4 MMOL/L (ref 3–14)
BUN SERPL-MCNC: 20 MG/DL (ref 7–30)
CALCIUM SERPL-MCNC: 9.4 MG/DL (ref 8.5–10.1)
CHLORIDE BLD-SCNC: 108 MMOL/L (ref 94–109)
CHOLEST SERPL-MCNC: 260 MG/DL
CO2 SERPL-SCNC: 27 MMOL/L (ref 20–32)
CREAT SERPL-MCNC: 1.33 MG/DL (ref 0.66–1.25)
FASTING STATUS PATIENT QL REPORTED: NO
GFR SERPL CREATININE-BSD FRML MDRD: 50 ML/MIN/1.73M2
GLUCOSE BLD-MCNC: 88 MG/DL (ref 70–99)
HDLC SERPL-MCNC: 46 MG/DL
LDLC SERPL CALC-MCNC: 183 MG/DL
NONHDLC SERPL-MCNC: 214 MG/DL
POTASSIUM BLD-SCNC: 4.1 MMOL/L (ref 3.4–5.3)
SODIUM SERPL-SCNC: 139 MMOL/L (ref 133–144)
TRIGL SERPL-MCNC: 154 MG/DL

## 2021-10-05 PROCEDURE — 80048 BASIC METABOLIC PNL TOTAL CA: CPT | Performed by: FAMILY MEDICINE

## 2021-10-05 PROCEDURE — 80061 LIPID PANEL: CPT | Performed by: FAMILY MEDICINE

## 2021-10-05 ASSESSMENT — MIFFLIN-ST. JEOR: SCORE: 1471.98

## 2021-10-05 NOTE — NURSING NOTE
"80 year old  Chief Complaint   Patient presents with     Physical     Finger     finger pain        Blood pressure 112/70, pulse 73, temperature 97.1  F (36.2  C), temperature source Skin, resp. rate 15, height 1.736 m (5' 8.35\"), weight 78.7 kg (173 lb 8 oz), SpO2 97 %. Body mass index is 26.11 kg/m .  Patient Active Problem List   Diagnosis     Preventative health care     Prostatic hyperplasia     Raynaud's syndrome     Persistent insomnia     Hypotestosteronism     Essential hypertension     Medicare annual wellness visit, subsequent       Wt Readings from Last 2 Encounters:   10/05/21 78.7 kg (173 lb 8 oz)   20 76.2 kg (168 lb)     BP Readings from Last 3 Encounters:   10/05/21 112/70   20 117/74   19 112/71         Current Outpatient Medications   Medication     amLODIPine (NORVASC) 5 MG tablet     Cholecalciferol (VITAMIN D) 1000 UNITS capsule     clonazePAM (KLONOPIN) 2 MG tablet     Doxylamine Succinate, Sleep, (SLEEP AID PO)     HERBALS     lisinopril (ZESTRIL) 5 MG tablet     testosterone (ANDROGEL 1%) 25 MG/2.5GM (1%) gel     guaiFENesin (MUCINEX) 600 MG 12 hr tablet     sildenafil (VIAGRA) 50 MG tablet     tadalafil (CIALIS) 5 MG tablet     UNABLE TO FIND     UNABLE TO FIND     No current facility-administered medications for this visit.       Social History     Tobacco Use     Smoking status: Former Smoker     Quit date:      Years since quittin.7     Smokeless tobacco: Never Used     Tobacco comment: 1/2 - 1 PPD over 30 years,    Substance Use Topics     Alcohol use: Yes     Comment: 3 drinks per month     Drug use: No       Health Maintenance Due   Topic Date Due     ADVANCE CARE PLANNING  Never done     MEDICARE ANNUAL WELLNESS VISIT  2021     FALL RISK ASSESSMENT  2021     INFLUENZA VACCINE (1) 2021       No results found for: PAP      2021 1:06 PM    "

## 2021-10-05 NOTE — PROGRESS NOTES
"CHIEF COMPLAINT: Medicare Annual Wellness Exam, Subsequent      HISTORY OF PRESENT ILLNESS:  Joe Lerner is a 80 year old male who presents for an annual wellness visit. Overall, he is doing well. He wears glasses and eye care is up to date, follows with ophthalmology every 6 months. No vision changes, monitoring for glaucoma. He has dry eye drops but none for glaucoma. No hearing changes or concerns. He has had cataract surgery. His dental care is up to date, sees them every 6 months as well. His BP is 112/70 today. Body mass index is 26.11 kg/m . He has gained about 5 pounds, and notes that he eats between meals and at night. He watches a movie every night and will get hungry. From an immunization standpoint, he is due for an influenza vaccine and elects to receive this today. Oli has received both doses of the Pfizer COVID-19 vaccine, administered on 1/28/21 and 2/18/21. He has been thinking about getting his COVID-19 booster, which he can receive at an [facility] pharmacy.     He had colon cancer screening via FIT card on 10/5/20 and elects to use Cologuard, ordered today. He has some prostatic hyperplasia and his PSA levels are monitored by endocrinology, typically around 13. His next appointment with them is this month so we will not complete a PSA today. Joe also follows with endocrinology for his hypotestosteronism. He uses a testosterone 25 mg topical gel.     He has a history of Raynaud's syndrome and has done well on amlodipine 5 mg daily. Oli also takes lisinopril 5 mg daily in addition to amlodipine for the treatment of hypertension. His BP today is excellent at 112/70. No issues with dizziness or lightheadedness.     Oli has noticed that his right middle finger has been getting \"stuck\" down, notably when typing or using his keyboard. He is able to reproduce this during our visit today. I recommended that he try using topical Voltaren 1% gel a few times a day for about one week to see if " "this will help resolve his symptoms. If not, we discussed that Oli could see Dr. Sullivan in the future for a cortisone injection into the joint.    MEDICARE PREVENTATIVE VISIT:   1. No problems with ADLs  2. No falls  3. No depression  4. Some difficulty remembering names immediately but is not concerned about this     FAMILY, SOCIAL AND PAST SURGICAL HISTORIES: Updated      MEDICATIONS:   Carefully Reviewed      REVIEW OF SYSTEMS:  10-point review of systems negative unless otherwise stated in the HPI.       OBJECTIVE:    EXAM:    GENERAL: Oli is in no distress.  Affect is upbeat.   Alert and oriented x3  /70 (BP Location: Left arm, Patient Position: Sitting, Cuff Size: Adult Regular)   Pulse 73   Temp 97.1  F (36.2  C) (Skin)   Resp 15   Ht 1.736 m (5' 8.35\")   Wt 78.7 kg (173 lb 8 oz)   SpO2 97%   BMI 26.11 kg/m    HEENT:  Head is normocephalic, atraumatic. Ears clear bilaterally. No pain with palpation of the frontal or maxillary sinuses.  Eyes are grossly normal.  Nose and mouth masked.  Neck is supple without adenopathy or thyromegaly.  No carotid bruits are heard, no JVD.   BACK:  Smooth and straight.  No pain to percussion.  No CVA tenderness.   LUNGS:  Clear to auscultation bilaterally.   HEART:  Regular rate and rhythm without murmur.   EXTREMITIES:  Ankles free of any edema.   Right Hand: no lumps or masses noted on the right third finger. Possible slight swelling in comparison to left third finger. Trigger finger is reproducible during our visit.  SKIN:  Warm and dry.       LABORATORY DATA:   No results found for any visits on 10/05/21.      ASSESSMENT AND PLAN:   1. Annual Wellness Exam: Up to date with healthcare maintenance strategies. Influenza vaccine administered today. BMP, lipid panel completed today.   2. Trigger middle finger of right hand: Oli can apply OTC topical Voltaren 1% gel to the right middle finger 2-4 times a day for next week. This may help resolve his symptoms. If his " symptoms persist, Oli can see Dr. Sullivan for a cortisone injection into the joint.  3. Essential hypertension: Oli is doing very well on his combination of lisinopril 5 mg daily and amlodipine 5 mg daily. He will continue on these medications.   4. Prostatic hyperplasia: Oli follows annually with endocrinology for prostatic hyperplasia and hypotestosteronism. He is seeing them later this month, and will complete an updated PSA at that time.   5. Screening for colon cancer: Cologuard ordered today.  6. Follow up in 1 year or call if there are any questions or concerns.      I, Jessica Guerrero, am serving as a scribe to document services personally performed by Dr. Van Foss, based on data collection and the provider's statements to me. Dr. Foss has reviewed, edited, and approved the above note.     I, Dr. Van Foss, have interviewed and examined this patient, and I have thoroughly reviewed and edited this note and agree with its contents. NATALIE

## 2021-10-05 NOTE — PATIENT INSTRUCTIONS
1. Try applying over-the-counter Voltaren (diclfenac) 1% gel to affect finger joint, 2-4 times per day for ~1 week.    2. If that doesn't fix the problem, see Dr. Carrington Sullivan at [facility] for a trigger finger injection.    3. Please call your insurance plan to see if Cologuard (for colon cancer screening) is covered.

## 2021-10-05 NOTE — NURSING NOTE
"Injectable Influenza Immunization Documentation    1.  Has the patient received the information for the injectable influenza vaccine? YES     2. Is the patient 6 months of age or older? YES     3. Does the patient have any of the following contraindications?         Severe allergy to eggs? No     Severe allergic reaction to previous influenza vaccines? No   Severe allergy to latex? No       History of Guillain-Birney syndrome? No     Currently have a temperature greater than 100.4F? No        4.  Severely egg allergic patients should have flu vaccine eligibility assessed by an MD, RN, or pharmacist, and those who received flu vaccine should be observed for 15 min by an MD, RN, Pharmacist, Medical Technician, or member of clinic staff.\": YES    5. Latex-allergic patients should be given latex-free influenza vaccine Yes. Please reference the Vaccine latex table to determine if your clinic s product is latex-containing.       Vaccination given by Rosangela Lao CMA,[facility]  October 5, 2021 3:01 PM                "

## 2021-10-14 DIAGNOSIS — F51.01 PRIMARY INSOMNIA: ICD-10-CM

## 2021-10-14 NOTE — TELEPHONE ENCOUNTER
Medication requested: clonazePAM (KLONOPIN) 2 MG tablet  Last office visit: 10/5/21  [facility] appointments: none  Medication last refilled: 9/20/21 per  review  Last qualifying labs: n/a    Next fill 10/20/21    Zully Ruby MS RN-BC  10/19/21  9:11 AM

## 2021-10-19 RX ORDER — CLONAZEPAM 2 MG/1
2 TABLET ORAL
Qty: 30 TABLET | Refills: 0 | Status: SHIPPED | OUTPATIENT
Start: 2021-10-20 | End: 2021-11-17

## 2021-10-26 ENCOUNTER — TRANSFERRED RECORDS (OUTPATIENT)
Dept: HEALTH INFORMATION MANAGEMENT | Facility: CLINIC | Age: 81
End: 2021-10-26

## 2021-10-28 ENCOUNTER — TRANSFERRED RECORDS (OUTPATIENT)
Dept: HEALTH INFORMATION MANAGEMENT | Facility: CLINIC | Age: 81
End: 2021-10-28

## 2021-11-17 DIAGNOSIS — F51.01 PRIMARY INSOMNIA: ICD-10-CM

## 2021-11-17 NOTE — TELEPHONE ENCOUNTER
Last visit 10/5/21  Last refill 10/21/21 - OK for refill 11/20/21 - checked .  Rita Marvin RN  [facility]

## 2021-11-18 RX ORDER — CLONAZEPAM 2 MG/1
2 TABLET ORAL
Qty: 30 TABLET | Refills: 0 | Status: SHIPPED | OUTPATIENT
Start: 2021-11-20 | End: 2021-12-14 | Stop reason: DRUGHIGH

## 2021-11-29 LAB — COLOGUARD-ABSTRACT: NEGATIVE

## 2021-12-14 DIAGNOSIS — I10 ESSENTIAL HYPERTENSION: Chronic | ICD-10-CM

## 2021-12-14 DIAGNOSIS — F51.01 PRIMARY INSOMNIA: ICD-10-CM

## 2021-12-14 RX ORDER — CLONAZEPAM 2 MG/1
2 TABLET ORAL
Qty: 30 TABLET | Refills: 0 | Status: CANCELLED | OUTPATIENT
Start: 2021-12-14

## 2021-12-14 RX ORDER — CLONAZEPAM 1 MG/1
TABLET ORAL
Qty: 60 TABLET | Refills: 0 | Status: SHIPPED | OUTPATIENT
Start: 2021-12-20 | End: 2022-01-18

## 2021-12-14 NOTE — TELEPHONE ENCOUNTER
Last visit 10/5/21  Last refill 11/20/21 - checked  - OK for refill 12/20/21.  Rita Marvin RN  [facility]

## 2021-12-14 NOTE — TELEPHONE ENCOUNTER
Who is calling? Patient  Medication name: clonazepam  Is this a refill request? No  Have they contacted the pharmacy?  Yes  Pharmacy:   [id number] [place] [facility] [place] [place] - [address] [place] [address]  Phone: [phone] Fax: [phone]    Question/Concern: Pt would like Pipo to change script to be 2 x 1 mg pills instead of the 1 x 2 mg pill.  He believes taking the 2 pill @ 1 mg is working more effective for him?  Would patient like a call back? Binta Lopez

## 2021-12-14 NOTE — TELEPHONE ENCOUNTER
Lisinopril Last time prescribed: 9/14/21 , 90 tabs/caps x 0 refills  Amlodipine Last time prescribed: 11/13/20 , 90 tabs/caps x 3 refills  Last office visit: 10/5/21  Next appointment: No Future Appointment Scheduled  BP Readings from Last 3 Encounters:   10/05/21 112/70   08/26/20 117/74   09/19/19 112/71     Zully Ruby MS RN-BC  12/15/21  3:09 PM

## 2021-12-15 RX ORDER — LISINOPRIL 5 MG/1
5 TABLET ORAL DAILY
Qty: 90 TABLET | Refills: 3 | Status: SHIPPED | OUTPATIENT
Start: 2021-12-15 | End: 2023-02-03

## 2021-12-15 RX ORDER — AMLODIPINE BESYLATE 5 MG/1
5 TABLET ORAL DAILY
Qty: 90 TABLET | Refills: 3 | Status: SHIPPED | OUTPATIENT
Start: 2021-12-15 | End: 2023-02-17

## 2022-01-14 DIAGNOSIS — F51.01 PRIMARY INSOMNIA: ICD-10-CM

## 2022-01-14 RX ORDER — CLONAZEPAM 1 MG/1
TABLET ORAL
Qty: 60 TABLET | Refills: 0 | Status: CANCELLED | OUTPATIENT
Start: 2022-01-19

## 2022-01-14 RX ORDER — CLONAZEPAM 1 MG/1
TABLET ORAL
Qty: 60 TABLET | Refills: 0 | Status: CANCELLED | OUTPATIENT
Start: 2022-01-14

## 2022-01-14 NOTE — TELEPHONE ENCOUNTER
Medication requested: clonazePAM (KLONOPIN) 1 MG tablet  Last office visit: 10/5/21  [facility] appointments: none  Medication last refilled: see below -  reviewed  Last qualifying labs:     Patient picked up last order 2 days early, should have been 12/20/21. Next fill is due 1/19/22

## 2022-01-14 NOTE — TELEPHONE ENCOUNTER
Who is calling? Patient  Medication name: clonazePAM (KLONOPIN) 1 MG tablet  Is this a refill request? Yes  Have they contacted the pharmacy?  Yes  Pharmacy:   [id number] - [place], [place] - [address] [place] [address]  Phone: [phone] Fax: [phone]    Question/Concern: Patient has enough till Monday  Would patient like a call back? No

## 2022-01-14 NOTE — TELEPHONE ENCOUNTER
Clonazepam (Klonopin) 1 mg    Last Office Visit: 10/5/21  Future [facility] Appointments: None  Medication last refilled: 12/20/21 #60 with 0 refill(s)     verified - last fill date: 12/18/21 #60    Routing refill request to provider for review/approval because:  Drug not on the [facility] refill protocol     Jaye Knutson RN, BSN

## 2022-01-18 RX ORDER — CLONAZEPAM 1 MG/1
TABLET ORAL
Qty: 60 TABLET | Refills: 0 | Status: SHIPPED | OUTPATIENT
Start: 2022-01-19 | End: 2022-02-14

## 2022-02-14 DIAGNOSIS — F51.01 PRIMARY INSOMNIA: ICD-10-CM

## 2022-02-15 RX ORDER — CLONAZEPAM 1 MG/1
2 TABLET ORAL
Qty: 60 TABLET | Refills: 0 | Status: SHIPPED | OUTPATIENT
Start: 2022-02-15 | End: 2022-03-21

## 2022-02-15 NOTE — TELEPHONE ENCOUNTER
Medication requested: clonazePAM (KLONOPIN) 1 MG tablet  Last office visit: 10/5/21  [facility] appointments: NONE  Medication last refilled: 1/19/22 - per  review  Last qualifying labs: n/a    Routing refill request to provider for review/approval because: Drug not on the [facility] refill protocol   Zully Ruby MS RN-BC  02/15/22  10:34 AM

## 2022-03-15 ENCOUNTER — LAB (OUTPATIENT)
Dept: URGENT CARE | Facility: URGENT CARE | Age: 82
End: 2022-03-15
Payer: COMMERCIAL

## 2022-03-15 DIAGNOSIS — Z20.822 SUSPECTED COVID-19 VIRUS INFECTION: ICD-10-CM

## 2022-03-15 PROCEDURE — U0003 INFECTIOUS AGENT DETECTION BY NUCLEIC ACID (DNA OR RNA); SEVERE ACUTE RESPIRATORY SYNDROME CORONAVIRUS 2 (SARS-COV-2) (CORONAVIRUS DISEASE [COVID-19]), AMPLIFIED PROBE TECHNIQUE, MAKING USE OF HIGH THROUGHPUT TECHNOLOGIES AS DESCRIBED BY CMS-2020-01-R: HCPCS

## 2022-03-15 PROCEDURE — U0005 INFEC AGEN DETEC AMPLI PROBE: HCPCS

## 2022-03-16 LAB — SARS-COV-2 RNA RESP QL NAA+PROBE: NEGATIVE

## 2022-03-21 DIAGNOSIS — F51.01 PRIMARY INSOMNIA: ICD-10-CM

## 2022-03-21 RX ORDER — CLONAZEPAM 1 MG/1
2 TABLET ORAL
Qty: 60 TABLET | Refills: 0 | Status: SHIPPED | OUTPATIENT
Start: 2022-03-21 | End: 2022-04-22

## 2022-03-21 NOTE — TELEPHONE ENCOUNTER
Clonazepam (Klonopin) 1 mg    Last Office Visit: 10/5/21  Future [facility] Appointments: 8/2/22  Medication last refilled: 2/15/22 #60 with 0 refill(s)     verified - last fill date: 2/19/22 #60    Routing refill request to provider for review/approval because:  Drug not on the [facility] refill protocol     VERNELL MartinesN, RN, CCM

## 2022-04-20 DIAGNOSIS — F51.01 PRIMARY INSOMNIA: ICD-10-CM

## 2022-04-22 RX ORDER — CLONAZEPAM 1 MG/1
2 TABLET ORAL
Qty: 60 TABLET | Refills: 0 | Status: SHIPPED | OUTPATIENT
Start: 2022-04-22 | End: 2022-05-19

## 2022-04-22 NOTE — TELEPHONE ENCOUNTER
Who is calling? Patient  Reason for Call: Patient is out of the medication as of today. Would like the medication sent over to the pharmacy sometime today.

## 2022-04-22 NOTE — TELEPHONE ENCOUNTER
Clonazepam (Klonopin) 1 mg    Last Office Visit: 10/5/21  Future [facility] Appointments: 8/2/22  Medication last refilled: 3/21/22 #60 with 0 refill(s)     verified - last fill date: 3/22/22 #60    Routing refill request to provider for review/approval because:  Drug not on the [facility] refill protocol     VERNELL MartinesN, RN, CCM

## 2022-05-19 DIAGNOSIS — F51.01 PRIMARY INSOMNIA: ICD-10-CM

## 2022-05-19 RX ORDER — CLONAZEPAM 1 MG/1
2 TABLET ORAL
Qty: 60 TABLET | Refills: 0 | Status: SHIPPED | OUTPATIENT
Start: 2022-05-22 | End: 2022-06-20

## 2022-05-19 NOTE — TELEPHONE ENCOUNTER
Last visit 10/5/21, future visit 8/2/22  Last refill 4/22/22 - checked  - OK for refill 5/22/22  Routing refill request to provider for review/approval because:  Drug not on the [facility] refill protocol   Rita Marvin RN  [facility]

## 2022-06-06 ENCOUNTER — OFFICE VISIT (OUTPATIENT)
Dept: FAMILY MEDICINE | Facility: CLINIC | Age: 82
End: 2022-06-06
Payer: COMMERCIAL

## 2022-06-06 VITALS
RESPIRATION RATE: 15 BRPM | SYSTOLIC BLOOD PRESSURE: 123 MMHG | WEIGHT: 172 LBS | HEART RATE: 69 BPM | OXYGEN SATURATION: 97 % | DIASTOLIC BLOOD PRESSURE: 78 MMHG | BODY MASS INDEX: 25.89 KG/M2 | TEMPERATURE: 97.5 F

## 2022-06-06 DIAGNOSIS — R09.82 PND (POST-NASAL DRIP): Primary | ICD-10-CM

## 2022-06-06 NOTE — NURSING NOTE
81 year old  Chief Complaint   Patient presents with     Sinus Problem     Sinus issues with cough and sinus drainage, x 4-5  month, COVID neg (FEB)       Blood pressure 123/78, pulse 69, temperature 97.5  F (36.4  C), temperature source Skin, resp. rate 15, weight 78 kg (172 lb), SpO2 97 %. Body mass index is 25.89 kg/m .  Patient Active Problem List   Diagnosis     Preventative health care     Prostatic hyperplasia     Raynaud's syndrome     Persistent insomnia     Hypotestosteronism     Essential hypertension     Medicare annual wellness visit, subsequent     Trigger middle finger of right hand       Wt Readings from Last 2 Encounters:   22 78 kg (172 lb)   10/05/21 78.7 kg (173 lb 8 oz)     BP Readings from Last 3 Encounters:   22 123/78   10/05/21 112/70   20 117/74         Current Outpatient Medications   Medication     amLODIPine (NORVASC) 5 MG tablet     Cholecalciferol (VITAMIN D) 1000 UNITS capsule     clonazePAM (KLONOPIN) 1 MG tablet     Doxylamine Succinate, Sleep, (SLEEP AID PO)     HERBALS     lisinopril (ZESTRIL) 5 MG tablet     testosterone (ANDROGEL) 25 MG/2.5GM (1%) gel     No current facility-administered medications for this visit.       Social History     Tobacco Use     Smoking status: Former Smoker     Quit date:      Years since quittin.4     Smokeless tobacco: Never Used     Tobacco comment: 1/2 - 1 PPD over 30 years,    Substance Use Topics     Alcohol use: Yes     Comment: 3 drinks per month     Drug use: No       Health Maintenance Due   Topic Date Due     ADVANCE CARE PLANNING  Never done     COVID-19 Vaccine (4 - Booster for Pfizer series) 2022       No results found for: PAP      2022 11:25 AM

## 2022-06-06 NOTE — PROGRESS NOTES
"  Assessment & Plan   Problem List Items Addressed This Visit    None     Visit Diagnoses     PND (post-nasal drip)    -  Primary         Symptoms much improved over the past few days. Discussed treatment options including nasal steroid and/or PO anti-histamine as needed if symptoms should return. Low suspicion for lung infection based on exam and vitals today. Low suspicion for GERD contribution but could consider treatment for this if symptoms declare more forcefully.     I do note Joe has been prescribed an ACE-I in the past. Given his report of chronic, dry, cough, I would consider switching to an alternative anti-hypertensive if he becomes more frustrated with his baseline symptoms.     22 minutes spent on the date of the encounter doing chart review, history and exam, documentation and further activities as noted.    Sheldon Kenyon MD  [facility]    Subjective   Joe is a 81 year old who presents for the following health issues    HPI   Sinus issues  - COVID negative  - always has a cough, for years, but it's usually dry and he's not too bothered by it  - for the past three months it has been worse, more productive  - feels like this is draining down from his sinuses  - feels \"itchy\" at the back of his throat  - actually, for the past few days, since making this appointment, his symptoms have gotten much better  - denies indigestion  - no wheezing or crackles  - no fever, chills, nausea, general body aches or fatigue  - hasn't really tried any sort of antihistamine or nasal steroid    Review of Systems   Constitutional, HEENT, cardiovascular, pulmonary, gi and gu systems are negative, except as otherwise noted.      Objective    /78 (BP Location: Right arm, Patient Position: Sitting, Cuff Size: Adult Regular)   Pulse 69   Temp 97.5  F (36.4  C) (Skin)   Resp 15   Wt 78 kg (172 lb)   SpO2 97%   BMI 25.89 kg/m    Body mass index is 25.89 kg/m .  Physical Exam   GENERAL: healthy, alert and " no distress  EYES: Eyes grossly normal to inspection, PERRL and conjunctivae and sclerae normal  HENT: ear canals and TM's normal, nose and mouth without ulcers or lesions  NECK: no adenopathy, no asymmetry, masses, or scars and thyroid normal to palpation  RESP: lungs clear to auscultation - no rales, rhonchi or wheezes  CV: regular rate and rhythm, normal S1 S2, no S3 or S4, no murmur, click or rub, no peripheral edema and peripheral pulses strong  ABDOMEN: soft, nontender, no hepatosplenomegaly, no masses and bowel sounds normal  MS: no gross musculoskeletal defects noted, no edema  SKIN: no suspicious lesions or rashes  NEURO: Normal strength and tone, mentation intact and speech normal  PSYCH: mentation appears normal, affect normal/bright

## 2022-06-20 DIAGNOSIS — F51.01 PRIMARY INSOMNIA: ICD-10-CM

## 2022-06-20 RX ORDER — CLONAZEPAM 1 MG/1
2 TABLET ORAL
Qty: 60 TABLET | Refills: 0 | Status: SHIPPED | OUTPATIENT
Start: 2022-06-20 | End: 2022-07-19

## 2022-06-20 NOTE — TELEPHONE ENCOUNTER
Clonazepam (Klonopin) 1 mg    Last Office Visit: 6/6/22  Future [facility] Appointments: 8/2/22  Medication last refilled: 5/22/22 #60 with 0 refill(s)     verified - last fill date: 5/21/22 #60    Routing refill request to provider for review/approval because:  Drug not on the [facility] [place] protocol     NILDA Martines, RN, CCM

## 2022-07-18 DIAGNOSIS — F51.01 PRIMARY INSOMNIA: ICD-10-CM

## 2022-07-19 NOTE — TELEPHONE ENCOUNTER
Medication requested: clonazePAM (KLONOPIN) 1 MG tablet  Last office visit: 6/6/22  [facility] appointments: 8/2/22  Medication last refilled: 6/20/22; 60 + 0 refills, last sold 6/23/22 per   Last qualifying labs: N/A    **Hold until Friday, 7/22/22**    Routing refill request to provider for review/approval because:  Drug not on the [facility] refill protocol     Rufino MUNGUIA, RN  07/19/22 6:10 PM

## 2022-07-22 RX ORDER — CLONAZEPAM 1 MG/1
2 TABLET ORAL
Qty: 60 TABLET | Refills: 0 | Status: SHIPPED | OUTPATIENT
Start: 2022-07-22 | End: 2022-08-19

## 2022-07-22 NOTE — TELEPHONE ENCOUNTER
reviewed, no concerns. Med refilled while PCP, Dr. Foss, is away.     Joe was seen today for refill request.    Diagnoses and all orders for this visit:    Primary insomnia  -     clonazePAM (KLONOPIN) 1 MG tablet; Take 2 tablets (2 mg) by mouth nightly as needed for sleep can be refilled every 30 days      Sheldon Kenyon MD  10:56 AM, July 22, 2022

## 2022-08-02 ENCOUNTER — OFFICE VISIT (OUTPATIENT)
Dept: FAMILY MEDICINE | Facility: CLINIC | Age: 82
End: 2022-08-02
Payer: COMMERCIAL

## 2022-08-02 VITALS
HEART RATE: 64 BPM | BODY MASS INDEX: 26.14 KG/M2 | WEIGHT: 172.5 LBS | OXYGEN SATURATION: 97 % | RESPIRATION RATE: 13 BRPM | TEMPERATURE: 97.9 F | SYSTOLIC BLOOD PRESSURE: 126 MMHG | HEIGHT: 68 IN | DIASTOLIC BLOOD PRESSURE: 78 MMHG

## 2022-08-02 DIAGNOSIS — Z13.220 SCREENING FOR HYPERLIPIDEMIA: ICD-10-CM

## 2022-08-02 DIAGNOSIS — Z00.00 MEDICARE ANNUAL WELLNESS VISIT, SUBSEQUENT: Primary | ICD-10-CM

## 2022-08-02 DIAGNOSIS — M62.81 MUSCLE WEAKNESS (GENERALIZED): ICD-10-CM

## 2022-08-02 DIAGNOSIS — I10 ESSENTIAL HYPERTENSION: ICD-10-CM

## 2022-08-02 DIAGNOSIS — R53.81 PHYSICAL DECONDITIONING: ICD-10-CM

## 2022-08-02 LAB
ANION GAP SERPL CALCULATED.3IONS-SCNC: 13 MMOL/L (ref 7–15)
BUN SERPL-MCNC: 21.7 MG/DL (ref 8–23)
CALCIUM SERPL-MCNC: 9.8 MG/DL (ref 8.8–10.2)
CHLORIDE SERPL-SCNC: 102 MMOL/L (ref 98–107)
CHOLEST SERPL-MCNC: 267 MG/DL
CREAT SERPL-MCNC: 1.23 MG/DL (ref 0.67–1.17)
DEPRECATED HCO3 PLAS-SCNC: 25 MMOL/L (ref 22–29)
GFR SERPL CREATININE-BSD FRML MDRD: 59 ML/MIN/1.73M2
GLUCOSE SERPL-MCNC: 94 MG/DL (ref 70–99)
HDLC SERPL-MCNC: 51 MG/DL
LDLC SERPL CALC-MCNC: 182 MG/DL
NONHDLC SERPL-MCNC: 216 MG/DL
POTASSIUM SERPL-SCNC: 4.4 MMOL/L (ref 3.4–5.3)
SODIUM SERPL-SCNC: 140 MMOL/L (ref 136–145)
TRIGL SERPL-MCNC: 168 MG/DL

## 2022-08-02 PROCEDURE — 80048 BASIC METABOLIC PNL TOTAL CA: CPT | Performed by: FAMILY MEDICINE

## 2022-08-02 PROCEDURE — 80061 LIPID PANEL: CPT | Performed by: FAMILY MEDICINE

## 2022-08-02 ASSESSMENT — ENCOUNTER SYMPTOMS
EYE PAIN: 0
DIARRHEA: 0
DYSURIA: 0
ARTHRALGIAS: 0
CHILLS: 0
ABDOMINAL PAIN: 0
NAUSEA: 0
NERVOUS/ANXIOUS: 0
CONSTIPATION: 0
DIZZINESS: 0
WEAKNESS: 0
MYALGIAS: 0
PARESTHESIAS: 0
HEMATOCHEZIA: 0
HEADACHES: 0
HEMATURIA: 0
SORE THROAT: 0
FEVER: 0
PALPITATIONS: 0
HEARTBURN: 0
COUGH: 0
SHORTNESS OF BREATH: 0
FREQUENCY: 0
JOINT SWELLING: 0

## 2022-08-02 ASSESSMENT — ACTIVITIES OF DAILY LIVING (ADL): CURRENT_FUNCTION: NO ASSISTANCE NEEDED

## 2022-08-02 NOTE — NURSING NOTE
"81 year old  Chief Complaint   Patient presents with     Medicare Visit       Blood pressure 126/78, pulse 64, temperature 97.9  F (36.6  C), temperature source Skin, resp. rate 13, height 1.72 m (5' 7.72\"), weight 78.2 kg (172 lb 8 oz), SpO2 97 %. Body mass index is 26.45 kg/m .  Patient Active Problem List   Diagnosis     Preventative health care     Prostatic hyperplasia     Raynaud's syndrome     Persistent insomnia     Hypotestosteronism     Essential hypertension     Medicare annual wellness visit, subsequent     Trigger middle finger of right hand       Wt Readings from Last 2 Encounters:   22 78.2 kg (172 lb 8 oz)   22 78 kg (172 lb)     BP Readings from Last 3 Encounters:   22 126/78   22 123/78   10/05/21 112/70         Current Outpatient Medications   Medication     amLODIPine (NORVASC) 5 MG tablet     Cholecalciferol (VITAMIN D) 1000 UNITS capsule     clonazePAM (KLONOPIN) 1 MG tablet     Doxylamine Succinate, Sleep, (SLEEP AID PO)     HERBALS     lisinopril (ZESTRIL) 5 MG tablet     testosterone (ANDROGEL) 25 MG/2.5GM (1%) gel     No current facility-administered medications for this visit.       Social History     Tobacco Use     Smoking status: Former Smoker     Quit date:      Years since quittin.6     Smokeless tobacco: Never Used     Tobacco comment: 1/2 - 1 PPD over 30 years,    Vaping Use     Vaping Use: Never used   Substance Use Topics     Alcohol use: Yes     Comment: 3 drinks per month     Drug use: No       There are no preventive care reminders to display for this patient.    No results found for: PAP      2022 1:43 PM    "

## 2022-08-02 NOTE — PROGRESS NOTES
"CHIEF COMPLAINT: Medicare Annual Wellness Exam, Subsequent      HISTORY OF PRESENT ILLNESS:  Joe Lerner is a 81 year old male who presents for an annual wellness visit.  Overall, he is doing well. He wears glasses and eye care is up to date. His dental care is up to date. No hearing changes and he follows with ENT every 1.5-2 years. His BP is 126/78 today. BMI is 26.45 today. From an immunization standpoint, he is completely up to date.     He had colon cancer screening with [facility] in 12/2021 which was negative. He will get prostate cancer screening with his urologist.     He has had a chronic productive throat and occasional scratching throat with sneezing and visited Dr. Kenyon in 6/2022 who recommended Loratidine. He was recommended to drink tea but is not a tea drinker - in discussing this recommendation with his home nursing, they recommended Moringa Oleifera three weeks ago. Cough has improved significantly and he may cough a few times in the morning but this is not bothersome. His cough, scratchy throat, and sneezing are typically worst in the winter.     He has had cold toes for years, but never turning blue/black or dusky. Home nursing told him that the OTC sleep aid (likely diphenhydramine) can cause cold toes/legs - he decreased from 2 tabs to 1 tab nightly and symptoms improved. His sleep is \"okay\" and he routinely goes to bed at 12:45am, wakes up at 5:45 am to urinate. Sometimes he is able to fall back asleep and other times he can't. He also takes naps during the day, lasting from 10 minutes - 90 minutes. He drinks 1.5 oz of Eniva Ultrashot drink instead of coffee in the morning.     He was previously taking Vitamin D supplements, accidentally picked up Vitamin E but will plan to get Vitamin D next time he goes.    He has a history of neck tightness which improved with flexible glasses and massage therapy, he is now able to do self-massage when symptoms begin to return. He also has some " "intermittent tinnitus in the left ear, but this is not bothersome to him.    MEDICARE PREVENTATIVE VISIT:  1. No problems with ADLs  2. No falls - he does worry about potential for falls in and out of his shower. He does have non-slip pads in his shower and has a hand rail in the shower. He also keeps his phone next to the shower.   3. No depression  4. Not concerned about memory loss     FAMILY, SOCIAL AND PAST SURGICAL HISTORIES: Updated      MEDICATIONS:   Carefully Reviewed      REVIEW OF SYSTEMS:  10-point review of systems negative unless otherwise stated in the HPI.       OBJECTIVE:    EXAM:    GENERAL: Joe is in no distress.  Affect is upbeat.   Alert and oriented x3  /78 (BP Location: Right arm, Patient Position: Sitting, Cuff Size: Adult Regular)   Pulse 64   Temp 97.9  F (36.6  C) (Skin)   Resp 13   Ht 1.72 m (5' 7.72\")   Wt 78.2 kg (172 lb 8 oz)   SpO2 97%   BMI 26.45 kg/m    HEENT:  Head is normocephalic, atraumatic. Ears clear bilaterally. No pain with palpation of the frontal or maxillary sinuses.  Eyes are grossly normal.  Nose and mouth are masked. Neck is supple without adenopathy or thyromegaly.  No carotid bruits are heard, no JVD.   BACK:  Smooth and straight.  No pain to percussion.  No CVA tenderness.   LUNGS:  Clear to auscultation bilaterally.   HEART:  Regular rate and rhythm without murmur.   EXTREMITIES:  Ankles free of any edema.   SKIN:  Warm and dry. Brisk capillary refill.         ASSESSMENT AND PLAN:   1. Medicare Annual Wellness Exam, Subsequent: Up to date with healthcare maintenance strategies. His last ColoGuard was negative in 2021  2. He follows with urology for testosterone and PSA  3. Ordered labs today - BMP  4. Follow up in 1 year or call if there are any questions or concerns.    I, Dr. Van Foss, have interviewed and examined this patient, and I have thoroughly reviewed and edited this note and agree with its contents.      Answers for HPI/ROS submitted " "by the patient on 8/2/2022  In general, how would you rate your overall physical health?: good  Frequency of exercise:: None  Do you usually eat at least 4 servings of fruit and vegetables a day, include whole grains & fiber, and avoid regularly eating high fat or \"junk\" foods? : Yes  Taking medications regularly:: Yes  Medication side effects:: None  Activities of Daily Living: no assistance needed  Home safety: no safety concerns identified  Hearing Impairment:: no hearing concerns  In the past 6 months, have you been bothered by leaking of urine?: No  abdominal pain: No  Blood in stool: No  Blood in urine: No  chest pain: No  chills: No  congestion: No  constipation: No  cough: No  diarrhea: No  dizziness: No  ear pain: No  eye pain: No  nervous/anxious: No  fever: No  frequency: No  genital sores: No  headaches: No  hearing loss: No  heartburn: No  arthralgias: No  joint swelling: No  peripheral edema: No  mood changes: No  myalgias: No  nausea: No  dysuria: No  palpitations: No  Skin sensation changes: No  sore throat: No  urgency: No  rash: No  shortness of breath: No  visual disturbance: No  weakness: No  impotence: No  penile discharge: No  In general, how would you rate your overall mental or emotional health?: good  Additional concerns today:: No      "

## 2022-08-19 DIAGNOSIS — F51.01 PRIMARY INSOMNIA: ICD-10-CM

## 2022-08-19 RX ORDER — CLONAZEPAM 1 MG/1
2 TABLET ORAL
Qty: 60 TABLET | Refills: 0 | Status: SHIPPED | OUTPATIENT
Start: 2022-08-19 | End: 2022-09-20

## 2022-08-19 NOTE — TELEPHONE ENCOUNTER
Clonazepam (Klonopin) 1 mg    Last Office Visit: 8/2/22  Future [facility] Appointments: None  Medication last refilled: 7/22/22 #60 with 0 refill(s)     verified - last fill date: 7/22/22 #60    Routing refill request to provider for review/approval because:  Drug not on the [facility] refill protocol     VERNELL MartinesN, RN, CCM

## 2022-08-22 ENCOUNTER — THERAPY VISIT (OUTPATIENT)
Dept: PHYSICAL THERAPY | Facility: CLINIC | Age: 82
End: 2022-08-22
Attending: FAMILY MEDICINE
Payer: COMMERCIAL

## 2022-08-22 DIAGNOSIS — R53.81 PHYSICAL DECONDITIONING: ICD-10-CM

## 2022-08-22 PROCEDURE — 97161 PT EVAL LOW COMPLEX 20 MIN: CPT | Mod: GP | Performed by: PHYSICAL THERAPIST

## 2022-08-22 PROCEDURE — 97110 THERAPEUTIC EXERCISES: CPT | Mod: GP | Performed by: PHYSICAL THERAPIST

## 2022-08-22 NOTE — PROGRESS NOTES
08/22/22 1300   Quick Adds   Type of Visit Initial OP PT Evaluation   General Information   Start of Care Date 08/22/22   Referring Physician Van Foss MD   Orders Evaluate and Treat as Indicated   Order Date 08/02/22   Medical Diagnosis deconditioning   Onset of illness/injury or Date of Surgery 08/02/22   Precautions/Limitations no known precautions/limitations   Surgical/Medical history reviewed Yes   Pertinent history of current problem (include personal factors and/or comorbidities that impact the POC) Patient reports toes are cold ~75% of the time. Patient reports multiple years of this- would get better with walking, but it would come right back as soon as he stopped. Patient recently stopped taking an over the counter sleeping aid- does notice improvement in toes. Patient denies pain or numbness in his feet. Patient reports some imbalance, but no falls. PMH: Hypertension   Prior level of function comment Independent-used to walk skyways   Patient role/Employment history Retired;Employed  (semi retired: )   Living environment Apartment/condo   Home/Community Accessibility Comments elevator access, has stairs he can use for exercise- uses HR   Assistive Devices Comments None   Patient/Family Goals Statement To decrease coldness in my toes   General Information Comments Health club: stationary bike, weights   Fall Risk Screen   Fall screen completed by PT   Have you fallen 2 or more times in the past year? No   Have you fallen and had an injury in the past year? No   Is patient a fall risk? No   Pain   Patient currently in pain No   Cognitive Status Examination   Orientation orientation to person, place and time   Level of Consciousness alert   Follows Commands and Answers Questions 100% of the time;able to follow multistep instructions   Personal Safety and Judgment intact   Memory intact   Integumentary   Integumentary Comments Mild swelling to bilateral feet and ankles   Posture   Posture  Forward head position   Palpation   Palpation Tenderness to lateral aspect of right digits and posterior to the lateral malleolus   Range of Motion (ROM)   ROM Comment Right ankle dorsiflexion limited to neutral, left ankle dorsiflexion around 5 degrees.  Significant restrictions in bilateral great toes, especially into flexion   Strength   Strength Comments Bilateral ankle dorsiflexion and plantarflexion grossly 5/5.  Significant bilateral foot intrinsics/toe flexors   Bed Mobility   Bed Mobility Comments Independent   Transfer Skills   Transfer Comments Independent   Gait   Gait Comments Patient ambulates without a device independently-no instability demonstrated, mild flexed forward posture   Balance Special Tests   Balance Special Tests Modified CTSIB Conditions;Sit to stand reps   Balance Special Tests Modified CTSIB Conditions   Condition 1, seconds 30 Seconds   Condition 2, seconds 30 Seconds   Condition 4, seconds 30 Seconds   Condition 5, seconds 9 Seconds   Balance Special Tests Sit to Stand Reps in 30 Seconds   Comments 13.5 seconds for 5 times sit to stand test   Sensory Examination   Sensory Perception Comments Patient denies numbness/tingling.  Intact to light touch   Planned Therapy Interventions   Planned Therapy Interventions ROM;strengthening;stretching;neuromuscular re-education   Clinical Impression   Criteria for Skilled Therapeutic Interventions Met yes, treatment indicated   PT Diagnosis Lower extremity range of motion deficits   Influenced by the following impairments Range of motion, strength, posture, pain   Functional limitations due to impairments Lower extremity symptoms during functional mobility tasks, deconditioning   Clinical Presentation Stable/Uncomplicated   Clinical Presentation Rationale Personal factors, body systems involved   Clinical Decision Making (Complexity) Low complexity   Therapy Frequency 1 time/week   Predicted Duration of Therapy Intervention (days/wks) 1 day    Risk & Benefits of therapy have been explained Yes   Patient, Family & other staff in agreement with plan of care Yes   Clinical Impression Comments Patient is an 81-year-old male who presents to outpatient physical therapy with reports of bilateral toe coldness that occurs around 75% of the time.  On exam patient demonstrates significant foot intrinsic, moderate range of motion restrictions which is impacting his overall activity participation and may be impacting his symptoms.  Patient also demonstrates mild swelling to bilateral feet and ankles.  Patient would benefit from skilled PT services for 1 day for appropriate home exercise program instruction and edema management techniques.  Patient is a good rehab candidate due to prior level of function and motivation   Education Assessment   Preferred Learning Style Demonstration;Pictures/video   Barriers to Learning No barriers   GOALS   PT Eval Goals 1   Goal 1   Goal Identifier HEP   Goal Description Patient will be independent home exercise program for maintenance therapy gains at discharge   Target Date 08/22/22   Total Evaluation Time   PT Terrence, Low Complexity Minutes (52601) 25     Iveth Velasco PT, DPT  Physical Therapist  [facility] [address]  [address] [place] [address]  [email or web address]  Appointments: [phone]

## 2022-08-22 NOTE — PROGRESS NOTES
[facility]    Outpatient Physical Therapy Discharge Note  Patient: Joe Lerner  : 1940    Beginning/End Dates of Reporting Period:  2022 to 2022    Referring Provider: Van Foss MD    Therapy Diagnosis: Lower extremity range of motion deficits     Client Self Report:      Objective Measurements:      mCTSIB: 30,30,30,9    5 times sit to stand test: 13.5 seconds    Goals:  Goal Identifier HEP   Goal Description Patient will be independent home exercise program for maintenance therapy gains at discharge   Target Date 22   Date Met  22   Progress (detail required for progress note):         Plan:  Discharge from therapy.    Discharge:    Reason for Discharge: Patient has met all goals.    Equipment Issued: N/A    Discharge Plan: Patient to continue home program.

## 2022-08-22 NOTE — DISCHARGE INSTRUCTIONS
Try lightweight compression stockings (20-30 mmHg). Wear them during the day, take off at night  Try these for at least 1 week. See if it helps reduce the sensation in your toes  2. Long term solution: stretching at least 3 times a day, strengthening toe exercises    Iveth Velasco PT, DPT  Physical Therapist  [facility] [address]  [address] [place] [address]  [email or web address]  Appointments: [phone]

## 2022-09-20 DIAGNOSIS — F51.01 PRIMARY INSOMNIA: ICD-10-CM

## 2022-09-20 RX ORDER — CLONAZEPAM 1 MG/1
2 TABLET ORAL
Qty: 60 TABLET | Refills: 0 | Status: SHIPPED | OUTPATIENT
Start: 2022-09-20 | End: 2022-10-18

## 2022-09-20 NOTE — TELEPHONE ENCOUNTER
Last visit 8/2/22, no future visit  Last refill clonazepam 1 mg 8/20/22 - checked   Routing refill request to provider for review/approval because:  Drug not on the [facility] refill protocol   Rita Marvin RN  [facility]

## 2022-10-18 DIAGNOSIS — F51.01 PRIMARY INSOMNIA: ICD-10-CM

## 2022-10-18 NOTE — TELEPHONE ENCOUNTER
Last visit 8/2/22, no future visit  Last refill clonazepam 1 mg 9/21/22 - checked  - OK for refill 10/21/22.  Routing refill request to provider for review/approval because:  Drug not on the [facility] refill protocol   Rita Marvin RN  [facility]

## 2022-10-19 RX ORDER — CLONAZEPAM 1 MG/1
2 TABLET ORAL
Qty: 60 TABLET | Refills: 0 | Status: SHIPPED | OUTPATIENT
Start: 2022-10-21 | End: 2022-11-18

## 2022-11-18 DIAGNOSIS — F51.01 PRIMARY INSOMNIA: ICD-10-CM

## 2022-11-18 RX ORDER — CLONAZEPAM 1 MG/1
2 TABLET ORAL
Qty: 60 TABLET | Refills: 0 | Status: SHIPPED | OUTPATIENT
Start: 2022-11-19 | End: 2022-12-19

## 2022-11-18 NOTE — TELEPHONE ENCOUNTER
Last visit 8/2/22, future appt 11/23/22  Last refill clonazepam 1 mg 10/20/22 - checked  - OK for refill 11/19/22  Routing refill request to provider for review/approval because:  Drug not on the [facility] refill protocol   Rita Marvin RN  [facility]

## 2022-11-19 ENCOUNTER — HEALTH MAINTENANCE LETTER (OUTPATIENT)
Age: 82
End: 2022-11-19

## 2022-11-23 ENCOUNTER — OFFICE VISIT (OUTPATIENT)
Dept: FAMILY MEDICINE | Facility: CLINIC | Age: 82
End: 2022-11-23
Payer: COMMERCIAL

## 2022-11-23 VITALS
WEIGHT: 173 LBS | RESPIRATION RATE: 12 BRPM | DIASTOLIC BLOOD PRESSURE: 75 MMHG | TEMPERATURE: 97.3 F | BODY MASS INDEX: 26.52 KG/M2 | OXYGEN SATURATION: 96 % | SYSTOLIC BLOOD PRESSURE: 122 MMHG | HEART RATE: 64 BPM

## 2022-11-23 DIAGNOSIS — S22.31XA CLOSED FRACTURE OF ONE RIB OF RIGHT SIDE, INITIAL ENCOUNTER: Primary | ICD-10-CM

## 2022-11-23 NOTE — PROGRESS NOTES
"Medical assistant intake:  Joe Lerner is a 82 year old male who presents to [facility] today for ER F/U (Broken Rib)        ASSESSMENT/PLAN:    1. Lower right rib fracture in an 81 yo. Injury occurred about 10 days ago. Doing well. Expanding air freely and to all lung fields.   - No further treatment needed other than to continue to be sure to take deep breaths.   -Anticipate the \"black and blue\" to track further down your body.   -Taper off of advil as soon as possible. Tylenol is better for pain when you have a fracture.   - Return if new symptoms arise.     --Arnulfo Sullivan MD      SUBJECTIVE:   This is my first time meeting Mr. Lerner. He is here for follow up of a Rib fracture suffered after a fall about 10 days ago.  He was sleeping at the time and then awakened by his phone ringing.  He fell back and his right thoracic area hit a barstool.  He thought he was okay for a few days but then the pain became more intense.  Seen in [facility] E.R. on 11/17, 6 days ago.  X-rays were taken and that documented a lower posterior right rib fracture.  He had clear lungs.  He was given an incentive spirometer.    He is here today and feeling quite well.  He is having no shortness of breath no difficulty with breathing or cough.  No troubles eating or with bowels or bladder.    Review Of Systems:    Has otherwise been in usual state of health, e.g.   Cardiovascular: negative  Respiratory: No shortness of breath, dyspnea on exertion, cough, or hemoptysis  Gastrointestinal: negative  Genitourinary: negative    Problem list per EMR:  Patient Active Problem List   Diagnosis     Preventative health care     Prostatic hyperplasia     Raynaud's syndrome     Persistent insomnia     Hypotestosteronism     Essential hypertension     Medicare annual wellness visit, subsequent     Trigger middle finger of right hand       Current Outpatient Medications   Medication Sig Dispense Refill     amLODIPine (NORVASC) 5 MG tablet Take 1 " tablet (5 mg) by mouth daily 90 tablet 3     Cholecalciferol (VITAMIN D) 1000 UNITS capsule Take 1 capsule by mouth daily       clonazePAM (KLONOPIN) 1 MG tablet Take 2 tablets (2 mg) by mouth nightly as needed for sleep can be refilled every 30 days 60 tablet 0     HERBALS        lisinopril (ZESTRIL) 5 MG tablet Take 1 tablet (5 mg) by mouth daily 90 tablet 3     testosterone (ANDROGEL) 25 MG/2.5GM (1%) gel Place 50 mg onto the skin daily       Doxylamine Succinate, Sleep, (SLEEP AID PO)  (Patient not taking: Reported on 11/23/2022)         No Known Allergies     Social:   Retired .      OBJECTIVE    Vitals: /75 (BP Location: Right arm, Patient Position: Sitting, Cuff Size: Adult Large)   Pulse 64   Temp 97.3  F (36.3  C) (Skin)   Resp 12   Wt 78.5 kg (173 lb)   SpO2 96%   BMI 26.52 kg/m    BMI= Body mass index is 26.52 kg/m .  He appears well and in no distress.  His gait is normal.  He is able to rise from a seated position without difficulty.    His area of discomfort is in the lower right thoracic ribs around ribs 9 and 10 in the posterior axillary line.  He has no bruising there but there is a large area of ecchymosis down lower towards the right iliac crest.  He does not believe he hit himself there and is not tender over the area and was even unaware that there was bruising there.    His lungs are clear to auscultation throughout including good air movement into the right lower lung fields.    Cardiovascular-regular rate and rhythm without murmur    SEE TOP OF NOTE FOR ASSESSMENT AND PLAN    --Arnulfo Sullivan MD  [facility], Department of Family Medicine and Community Health

## 2022-11-23 NOTE — NURSING NOTE
82 year old  Chief Complaint   Patient presents with     ER F/U     Broken Rib       Blood pressure 122/75, pulse 64, temperature 97.3  F (36.3  C), temperature source Skin, resp. rate 12, weight 78.5 kg (173 lb), SpO2 96 %. Body mass index is 26.52 kg/m .  Patient Active Problem List   Diagnosis     Preventative health care     Prostatic hyperplasia     Raynaud's syndrome     Persistent insomnia     Hypotestosteronism     Essential hypertension     Medicare annual wellness visit, subsequent     Trigger middle finger of right hand       Wt Readings from Last 2 Encounters:   22 78.5 kg (173 lb)   22 78.2 kg (172 lb 8 oz)     BP Readings from Last 3 Encounters:   22 122/75   22 126/78   22 123/78         Current Outpatient Medications   Medication     amLODIPine (NORVASC) 5 MG tablet     Cholecalciferol (VITAMIN D) 1000 UNITS capsule     clonazePAM (KLONOPIN) 1 MG tablet     HERBALS     lisinopril (ZESTRIL) 5 MG tablet     testosterone (ANDROGEL) 25 MG/2.5GM (1%) gel     Doxylamine Succinate, Sleep, (SLEEP AID PO)     No current facility-administered medications for this visit.       Social History     Tobacco Use     Smoking status: Former     Types: Cigarettes     Quit date:      Years since quittin.9     Smokeless tobacco: Never     Tobacco comments:     1/2 - 1 PPD over 30 years,    Vaping Use     Vaping Use: Never used   Substance Use Topics     Alcohol use: Yes     Comment: 3 drinks per month     Drug use: No       Health Maintenance Due   Topic Date Due     INFLUENZA VACCINE (1) 2022     COVID-19 Vaccine (5 - Booster for Pfizer series) 2022       No results found for: PAP      2022 1:20 PM

## 2022-12-19 DIAGNOSIS — F51.01 PRIMARY INSOMNIA: ICD-10-CM

## 2022-12-19 RX ORDER — CLONAZEPAM 1 MG/1
2 TABLET ORAL
Qty: 60 TABLET | Refills: 0 | Status: SHIPPED | OUTPATIENT
Start: 2022-12-19 | End: 2023-01-19

## 2022-12-19 NOTE — TELEPHONE ENCOUNTER
Last visit 11/23/22, no future visit  Last refill clonazepam 1 mg 11/19/22 - checked   Routing refill request to provider for review/approval because:  Drug not on the [facility] refill protocol   Rita Marvin RN  [facility]

## 2023-01-18 DIAGNOSIS — F51.01 PRIMARY INSOMNIA: ICD-10-CM

## 2023-01-18 NOTE — TELEPHONE ENCOUNTER
Clonazepam (Klonopin) 1 mg    Last Office Visit: 11/23/22  Future [facility] Appointments: None  Medication last refilled: 12/19/22 #60 with 0 refill(s)     verified - last fill date: 12/19/22 #60    Routing refill request to provider for review/approval because:  Drug not on the [facility] refill protocol     VERNELL MartinesN, RN, CCM

## 2023-01-19 RX ORDER — CLONAZEPAM 1 MG/1
2 TABLET ORAL
Qty: 60 TABLET | Refills: 0 | Status: SHIPPED | OUTPATIENT
Start: 2023-01-19 | End: 2023-02-17

## 2023-02-03 DIAGNOSIS — I10 ESSENTIAL HYPERTENSION: Chronic | ICD-10-CM

## 2023-02-03 RX ORDER — LISINOPRIL 5 MG/1
5 TABLET ORAL DAILY
Qty: 90 TABLET | Refills: 1 | Status: SHIPPED | OUTPATIENT
Start: 2023-02-03 | End: 2023-08-23

## 2023-02-03 NOTE — TELEPHONE ENCOUNTER
Medication requested: lisinopril (ZESTRIL) 5 MG tablet  Last office visit: 11/23/22  [facility] appointments: none  Medication last refilled: 12/15/21; 90 + 3 refills  Last qualifying labs:   BP Readings from Last 3 Encounters:   11/23/22 122/75   08/02/22 126/78   06/06/22 123/78     Component      Latest Ref Rng & Units 8/2/2022   Creatinine      0.67 - 1.17 mg/dL 1.23 (H)     Prescription approved per [facility] Refill Protocol.    Rufino MUNGUIA, RN  02/03/23 2:52 PM

## 2023-02-16 DIAGNOSIS — I10 ESSENTIAL HYPERTENSION: Chronic | ICD-10-CM

## 2023-02-17 DIAGNOSIS — F51.01 PRIMARY INSOMNIA: ICD-10-CM

## 2023-02-17 RX ORDER — CLONAZEPAM 1 MG/1
2 TABLET ORAL
Qty: 60 TABLET | Refills: 0 | Status: SHIPPED | OUTPATIENT
Start: 2023-02-17 | End: 2023-03-20

## 2023-02-17 RX ORDER — AMLODIPINE BESYLATE 5 MG/1
5 TABLET ORAL DAILY
Qty: 90 TABLET | Refills: 3 | Status: SHIPPED | OUTPATIENT
Start: 2023-02-17 | End: 2024-03-20

## 2023-02-17 NOTE — TELEPHONE ENCOUNTER
Amlodipine (Norvasc) 5 mg    Last Office Visit: 11/23/22  [facility] Appointments: None  Medication last refilled: 12/15/21 #90 with 3 refill(s)    Vital Signs 10/5/2021 6/6/2022 11/23/2022   Systolic 112 123 122   Diastolic 70 78 75     Required labs per protocol:    LAB REF RANGE 10/5/21 8/2/22   Creatinine 0.67-1.17 mg/dL 1.33 High 1.23 High     Prescription approved per [facility] Refill Protocol.    Jaye Knutson, VERNELLN, RN, CCM

## 2023-02-17 NOTE — TELEPHONE ENCOUNTER
Medication requested: clonazePAM (KLONOPIN) 1 MG tablet  Last office visit: 11/23/22  [facility] appointments: none  Medication last refilled: 1/19/23; 60 + 0 refills, last sold 1/19/23 per   Last qualifying labs: N/A    Routing refill request to provider for review/approval because:  Drug not on the [facility] refill protocol     Rufino MUNGUIA, RN  02/17/23 12:25 PM

## 2023-03-01 DIAGNOSIS — E34.9 HYPOTESTOSTERONISM: Primary | ICD-10-CM

## 2023-03-01 NOTE — TELEPHONE ENCOUNTER
Pt's endocrinologists at [facility] no longer there and was prescribing him Testerone.    He is now been out for a week and can't get an appointment with anyone before April.    He wondered if Pipo could get him seen sooner or wonders what he can do?    Also had a questions about a Testerone gel he uses and if its okay that he hasn't used that in several days?    Jessica

## 2023-03-01 NOTE — TELEPHONE ENCOUNTER
Returned pt's call re: testosterone and left voicemail.    NILDA Polanco, RN  03/01/23, 12:00 PM

## 2023-03-02 RX ORDER — TESTOSTERONE GEL, 1% 10 MG/G
25 GEL TRANSDERMAL DAILY
Qty: 75 G | Refills: 1 | Status: SHIPPED | OUTPATIENT
Start: 2023-03-02 | End: 2023-05-19

## 2023-03-02 RX ORDER — TESTOSTERONE GEL, 1% 10 MG/G
50 GEL TRANSDERMAL DAILY
Status: CANCELLED | OUTPATIENT
Start: 2023-03-02

## 2023-03-02 NOTE — TELEPHONE ENCOUNTER
Spoke with pt regarding his transdermal testosterone prescription. This is managed by an endocrinologist at [facility] and there are refills on file at his [facility] pharmacy at [address], but the product is backordered and not available to dispense. Prescription was transferred to [facility] at [address] [place] [address] and pharmacist at [address] location mentioned they may be able to fill it. Called [address] [place] [address] location and they do not have it in stock and mention that the product is backordered as well. Also called [facility] pharmacy to see if product was available and they do not have it. Pharmacist mentioned they may be able to order a higher dosage strength (50 mg/5 g packet). Will plan to send new order for short-term refill and explained to pt that this does not guarantee he will get the medication and that he is still expected to follow up with his endocrinology team at [facility] in April.    Rufino MUNGUIA, RN  03/02/23 12:40 PM

## 2023-03-17 DIAGNOSIS — F51.01 PRIMARY INSOMNIA: ICD-10-CM

## 2023-03-20 RX ORDER — CLONAZEPAM 1 MG/1
2 TABLET ORAL
Qty: 60 TABLET | Refills: 0 | Status: SHIPPED | OUTPATIENT
Start: 2023-03-20 | End: 2023-04-20

## 2023-03-20 NOTE — TELEPHONE ENCOUNTER
Medication requested: clonazePAM (KLONOPIN) 1 MG tablet  Last office visit: 11/23/22  [facility] appointments: none  Medication last refilled: 2/17/23; 60 + 0 refills, last sold 2/18/23 per   Last qualifying labs: N/A    Routing refill request to provider for review/approval because:  Drug not on the [facility] refill protocol     Rufino MUNGUIA, RN  03/20/23 10:55 AM

## 2023-03-27 ENCOUNTER — OFFICE VISIT (OUTPATIENT)
Dept: FAMILY MEDICINE | Facility: CLINIC | Age: 83
End: 2023-03-27
Payer: COMMERCIAL

## 2023-03-27 VITALS
RESPIRATION RATE: 15 BRPM | TEMPERATURE: 96.6 F | DIASTOLIC BLOOD PRESSURE: 64 MMHG | HEART RATE: 71 BPM | OXYGEN SATURATION: 97 % | WEIGHT: 169 LBS | BODY MASS INDEX: 25.61 KG/M2 | HEIGHT: 68 IN | SYSTOLIC BLOOD PRESSURE: 100 MMHG

## 2023-03-27 DIAGNOSIS — J01.90 ACUTE SINUSITIS, RECURRENCE NOT SPECIFIED, UNSPECIFIED LOCATION: Primary | ICD-10-CM

## 2023-03-27 RX ORDER — AMOXICILLIN 875 MG
875 TABLET ORAL 2 TIMES DAILY
Qty: 14 TABLET | Refills: 0 | Status: SHIPPED | OUTPATIENT
Start: 2023-03-27 | End: 2023-10-18

## 2023-03-27 NOTE — PROGRESS NOTES
"  Assessment & Plan   Problem List Items Addressed This Visit    None  Visit Diagnoses     Acute sinusitis, recurrence not specified, unspecified location    -  Primary    Relevant Medications    amoxicillin (AMOXIL) 875 MG tablet         Discussed with patient that symptoms may in fact be due to a viral URI and that ABX treatment does care some risk. Patient would like to proceed with ABX today. Agreed to RX as noted above. Encouraged Joe to contact this clinic with any further questions or concerns.     25 minutes spent on the date of the encounter doing chart review, history and exam, documentation and further activities as noted.    Sheldon Kenyon MD  [facility]    Subjective   Joe is a 82 year old, presenting for the following health issues:  Sinus Problem (Last week, experienced sinus infection sx--nasal drip, coughing, mucus.)    HPI   Sinus Infection  - started a week ago  - fatigue, productive cough, PND, sore throat  - no fever but some chills  - per patient, current symptoms are exactly like a previous episode where he came in to clinic and Dr. Foss prescribed an antibiotic that took care of his symptoms very quickly  - Joe is hoping he can get an antibiotic again this time.       Review of Systems   Constitutional, HEENT, cardiovascular, pulmonary, gi and gu systems are negative, except as otherwise noted.      Objective    /64 (BP Location: Right arm, Patient Position: Sitting, Cuff Size: Adult Regular)   Pulse 71   Temp (!) 96.6  F (35.9  C) (Skin)   Resp 15   Ht 1.727 m (5' 8\")   Wt 76.7 kg (169 lb)   SpO2 97%   BMI 25.70 kg/m    Body mass index is 25.7 kg/m .  Physical Exam    GENERAL: healthy, alert and no distress  NECK: no adenopathy, no asymmetry, masses, or scars and thyroid normal to palpation  RESP: lungs clear to auscultation - no rales, rhonchi or wheezes  CV: regular rate and rhythm, normal S1 S2, no S3 or S4, no murmur, click or rub, no peripheral edema and " peripheral pulses strong  ABDOMEN: soft, nontender, no hepatosplenomegaly, no masses and bowel sounds normal  MS: no gross musculoskeletal defects noted, no edema

## 2023-03-27 NOTE — NURSING NOTE
"82 year old  Chief Complaint   Patient presents with     Sinus Problem     Last week, experienced sinus infection sx--nasal drip, coughing, mucus.       Blood pressure 100/64, pulse 71, temperature (!) 96.6  F (35.9  C), temperature source Skin, resp. rate 15, height 1.727 m (5' 8\"), weight 76.7 kg (169 lb), SpO2 97 %. Body mass index is 25.7 kg/m .  Patient Active Problem List   Diagnosis     Preventative health care     Prostatic hyperplasia     Raynaud's syndrome     Persistent insomnia     Hypotestosteronism     Essential hypertension     Medicare annual wellness visit, subsequent     Trigger middle finger of right hand       Wt Readings from Last 2 Encounters:   23 76.7 kg (169 lb)   22 78.5 kg (173 lb)     BP Readings from Last 3 Encounters:   23 100/64   22 122/75   22 126/78         Current Outpatient Medications   Medication     amLODIPine (NORVASC) 5 MG tablet     Cholecalciferol (VITAMIN D) 1000 UNITS capsule     clonazePAM (KLONOPIN) 1 MG tablet     HERBALS     lisinopril (ZESTRIL) 5 MG tablet     testosterone (ANDROGEL) 25 MG/2.5GM (1%) gel     testosterone (ANDROGEL/TESTIM) 50 MG/5GM (1%) topical gel     No current facility-administered medications for this visit.       Social History     Tobacco Use     Smoking status: Former     Types: Cigarettes     Quit date:      Years since quittin.2     Smokeless tobacco: Never     Tobacco comments:     1/2 - 1 PPD over 30 years,    Vaping Use     Vaping Use: Never used   Substance Use Topics     Alcohol use: Yes     Comment: 3 drinks per month     Drug use: No       Health Maintenance Due   Topic Date Due     ADVANCE CARE PLANNING  Never done     COVID-19 Vaccine (5 - Booster for Pfizer series) 2022       No results found for: PAP      2023 1:03 PM    "

## 2023-04-20 DIAGNOSIS — F51.01 PRIMARY INSOMNIA: ICD-10-CM

## 2023-04-20 RX ORDER — CLONAZEPAM 1 MG/1
2 TABLET ORAL
Qty: 60 TABLET | Refills: 0 | Status: SHIPPED | OUTPATIENT
Start: 2023-04-20 | End: 2023-05-19

## 2023-04-20 NOTE — TELEPHONE ENCOUNTER
Medication requested: clonazePAM (KLONOPIN) 1 MG tablet  Last office visit: 3/27/23  [facility] appointments: none  Medication last refilled: 3/20/23; 60 + 0 refills, last sold 3/21/23 per   Last qualifying labs: N/A    Routing refill request to provider for review/approval because:  Drug not on the [facility] refill protocol     Rufino MUNGUIA, RN  04/20/23 9:46 AM

## 2023-05-19 DIAGNOSIS — F51.01 PRIMARY INSOMNIA: ICD-10-CM

## 2023-05-19 RX ORDER — CLONAZEPAM 1 MG/1
2 TABLET ORAL
Qty: 60 TABLET | Refills: 0 | Status: SHIPPED | OUTPATIENT
Start: 2023-05-19 | End: 2023-06-19

## 2023-05-19 NOTE — TELEPHONE ENCOUNTER
Clonzaepam (Klonopin) 1 mg    Last Office Visit: 3/27/23  Future [facility] Appointments: None  Medication last refilled: 4/20/23 #60 with 0 refill(s)     verified - last fill date: 4/20/23 #60    Routing refill request to provider for review/approval because:  Drug not on the [facility] refill protocol     VERNELL MartinesN, RN, CCM

## 2023-06-19 DIAGNOSIS — F51.01 PRIMARY INSOMNIA: ICD-10-CM

## 2023-06-19 RX ORDER — CLONAZEPAM 1 MG/1
2 TABLET ORAL
Qty: 60 TABLET | Refills: 0 | Status: CANCELLED | OUTPATIENT
Start: 2023-06-19

## 2023-06-19 RX ORDER — CLONAZEPAM 1 MG/1
2 TABLET ORAL
Qty: 60 TABLET | Refills: 0 | Status: SHIPPED | OUTPATIENT
Start: 2023-06-19 | End: 2023-07-18

## 2023-06-19 NOTE — TELEPHONE ENCOUNTER
Who is calling? Patient  Reason for Call:Will be out of medication tomorrow. Requesting refill today

## 2023-06-19 NOTE — TELEPHONE ENCOUNTER
Clonazepam (Klonopin) 1 mg    Last Office Visit: 3/27/23  Future [facility] Appointments: None  Medication last refilled: 5/19/23 #60 with 0 refill(s)     verified - last fill date: 5/20/23 #60    Routing refill request to provider for review/approval because:  Drug not on the [facility] refill protocol     VERNELL MartinesN, RN, CCM

## 2023-07-18 DIAGNOSIS — F51.01 PRIMARY INSOMNIA: ICD-10-CM

## 2023-07-18 NOTE — TELEPHONE ENCOUNTER
Last visit 3/27/23, no future visit  Last refill clonazepam 6/20/23 - checked   OK for refill 7/20/23  Routing refill request to provider for review/approval because:  Drug not on the [facility] refill protocol   Rita Marvin RN  [facility]

## 2023-07-19 RX ORDER — CLONAZEPAM 1 MG/1
2 TABLET ORAL
Qty: 60 TABLET | Refills: 0 | Status: SHIPPED | OUTPATIENT
Start: 2023-07-20 | End: 2023-08-17

## 2023-08-17 DIAGNOSIS — F51.01 PRIMARY INSOMNIA: ICD-10-CM

## 2023-08-17 RX ORDER — CLONAZEPAM 1 MG/1
2 TABLET ORAL
Qty: 60 TABLET | Refills: 0 | Status: SHIPPED | OUTPATIENT
Start: 2023-08-17 | End: 2023-09-18

## 2023-08-17 NOTE — TELEPHONE ENCOUNTER
Clonazepam (Klonopin) 1 mg    Last Office Visit: 3/27/23  [facility] Appointments: None  Medication last refilled: 7/20/23 #60 with 0 refill(s)     verified - last fill date: 7/20/23 #60    Routing refill request to provider for review/approval because:  Drug not on the [facility] refill protocol     VERNELL MartinesN, RN, CCM     (3) slightly limited

## 2023-08-23 DIAGNOSIS — I10 ESSENTIAL HYPERTENSION: Chronic | ICD-10-CM

## 2023-08-24 NOTE — TELEPHONE ENCOUNTER
Medication requested: lisinopril (ZESTRIL) 5 MG tablet   Last office visit: 3/27/23  [facility] appointments: 8/30/23  Medication last refilled: 2/3/23; 90 + 1 refill  Last qualifying labs:   BP Readings from Last 3 Encounters:   03/27/23 100/64   11/23/22 122/75   08/02/22 126/78     Component      Latest Ref Rng 8/2/2022  2:36 PM   Creatinine      0.67 - 1.17 mg/dL 1.23 (H)       Component      Latest Ref Rng 8/2/2022  2:36 PM   Potassium      3.4 - 5.3 mmol/L 4.4      Routing refill request to provider for review/approval because:  Labs out of range:  creatinine    Rufino MUNGUIA, RN  08/24/23 9:26 AM

## 2023-08-25 RX ORDER — LISINOPRIL 5 MG/1
5 TABLET ORAL DAILY
Qty: 90 TABLET | Refills: 1 | Status: SHIPPED | OUTPATIENT
Start: 2023-08-25 | End: 2024-03-20

## 2023-08-29 NOTE — PROGRESS NOTES
SUBJECTIVE:   Joe is a 82 year old who presents for Preventive Visit.      Are you in the first 12 months of your Medicare coverage?  No    HPI  # Health Maintenance  - BP:   BP Readings from Last 3 Encounters:   23 122/78   23 100/64   22 122/75   - Cholesterol: pending  Recent Labs   Lab Test 22  1436   CHOL 267*   HDL 51   *   TRIG 168*   The ASCVD Risk score (Yohan SANCHEZ, et al., 2019) failed to calculate for the following reasons:    The 2019 ASCVD risk score is only valid for ages 40 to 79  - Diabetes Screening: pending  - Lung Cancer Screening: not indicated  55-81yo w/30py smoking history and currently smoking OR quit within past 15 years:  Low dose CT annually and discontinued once a person has been 15 years tobacco free  - (+) seatbelt use, (+) helmet, (+) smoke detector  - Feels safe at home, denies verbal/physical/emotional abuse in past year:   - Colonoscopy: negative Cologuard in   - PSA history    22 12.8    22 14.9    10/21/20 12.3    - elevated as far back as 12 years ago    Fall risk  Fallen 2 or more times in the past year?: No  Any fall with injury in the past year?: Yes (Fractured rib)    Cognitive Screening   1) Repeat 3 items (Leader, Season, Table)    2) Clock draw: NORMAL  3) 3 item recall: Recalls 3 objects  Results: 3 items recalled: COGNITIVE IMPAIRMENT LESS LIKELY    Mini-CogTM Copyright LUCIEN Burroughs. Licensed by the author for use in [facility]; reprinted with permission (indigo@.[email or web address]). All rights reserved.      Do you have sleep apnea, excessive snoring or daytime drowsiness? : no    Reviewed and updated as needed this visit by clinical staff                  Reviewed and updated as needed this visit by Provider                 Social History     Tobacco Use     Smoking status: Former     Types: Cigarettes     Quit date:      Years since quittin.6     Smokeless tobacco: Never     Tobacco comments:     1/2 - 1 PPD over 30  "years,    Substance Use Topics     Alcohol use: Yes     Comment: 3 drinks per month           8/2/2022     1:41 PM   Alcohol Use   Prescreen: >3 drinks/day or >7 drinks/week? No     PROBLEMS TO ADD ON...  Low testosterone  - previously was taking gel but then, during COVID he couldn't find the medication so ended up stopping the medication  - later was prescribed a different testosterone replacement by his endocrinologist but then testing revealed a very elevated level  - testosterone was stopped and Joe was advised to wait 6 months and then schedule a follow up visit  - tried to make the 6 month follow up visit in September but can't get in until November  - in the mean time, he asked his endocrinology clinic for repeat testing but they refused to do the testing  - requests testing today    Current providers sharing in care for this patient include:   Patient Care Team:  Van Foss MD as PCP - General ([facility])  Arnulfo Sullivan MD as MD ([facility])  Van Foss MD as Assigned PCP    The following health maintenance items are reviewed in Epic and correct as of today:  Health Maintenance   Topic Date Due     ADVANCE CARE PLANNING  Never done     COVID-19 Vaccine (5 - Pfizer series) 09/14/2022     DTAP/TDAP/TD IMMUNIZATION (2 - Td or Tdap) 05/10/2023     FALL RISK ASSESSMENT  08/02/2023     MEDICARE ANNUAL WELLNESS VISIT  08/02/2023     INFLUENZA VACCINE (1) 09/01/2023     PHQ-2 (once per calendar year)  Completed     Pneumococcal Vaccine: 65+ Years  Completed     ZOSTER IMMUNIZATION  Completed     IPV IMMUNIZATION  Aged Out     MENINGITIS IMMUNIZATION  Aged Out       Review of Systems  Constitutional, HEENT, cardiovascular, pulmonary, gi and gu systems are negative, except as otherwise noted.    OBJECTIVE:   There were no vitals taken for this visit. Estimated body mass index is 25.7 kg/m  as calculated from the following:    Height as of 3/27/23: 1.727 m (5' 8\").    Weight " "as of 3/27/23: 76.7 kg (169 lb).  Physical Exam  GENERAL: healthy, alert and no distress  NECK: no adenopathy, no asymmetry, masses, or scars and thyroid normal to palpation  RESP: lungs clear to auscultation - no rales, rhonchi or wheezes  CV: regular rate and rhythm, normal S1 S2, no S3 or S4, no murmur, click or rub, no peripheral edema and peripheral pulses strong  ABDOMEN: soft, nontender, no hepatosplenomegaly, no masses and bowel sounds normal  MS: no gross musculoskeletal defects noted, no edema    Diagnostic Test Results:  Labs reviewed in Epic    ASSESSMENT / PLAN:   Joe was seen today for physical.    Diagnoses and all orders for this visit:    Prostatic hyperplasia  -     PSA tumor marker; Future  -     PSA tumor marker    Essential hypertension  -     Basic metabolic panel; Future  -     Basic metabolic panel    Screening for prostate cancer  -     Lipid Profile; Future  -     Lipid Profile    Screening for metabolic disorder  -     Basic metabolic panel; Future  -     Basic metabolic panel    Hypotestosteronism  -     Testosterone Free and Total; Future  -     Testosterone Free and Total    Will recheck PSA today given history, although Oli denies any new concerning symptoms.     Agreed to testosterone testing today. Could consider restarting testosterone replacement. I am curious about endocrinology not willing to run testing. Would want to consider this further before starting medication.     Patient has been advised of split billing requirements and indicates understanding: Yes      COUNSELING:  Reviewed preventive health counseling, as reflected in patient instructions      BMI:   Estimated body mass index is 25.7 kg/m  as calculated from the following:    Height as of 3/27/23: 1.727 m (5' 8\").    Weight as of 3/27/23: 76.7 kg (169 lb).         He reports that he quit smoking about 35 years ago. He has never used smokeless tobacco.      Appropriate preventive services were discussed with this " patient, including applicable screening as appropriate for cardiovascular disease, diabetes, osteopenia/osteoporosis, and glaucoma.  As appropriate for age/gender, discussed screening for colorectal cancer, prostate cancer, breast cancer, and cervical cancer. Checklist reviewing preventive services available has been given to the patient.    Reviewed patients plan of care and provided an AVS. The Basic Care Plan (routine screening as documented in Health Maintenance) for Joe meets the Care Plan requirement. This Care Plan has been established and reviewed with the Patient.          MD LOUISE Centeno [facility]

## 2023-08-30 ENCOUNTER — OFFICE VISIT (OUTPATIENT)
Dept: FAMILY MEDICINE | Facility: CLINIC | Age: 83
End: 2023-08-30
Payer: COMMERCIAL

## 2023-08-30 VITALS
OXYGEN SATURATION: 99 % | BODY MASS INDEX: 25.12 KG/M2 | WEIGHT: 165.75 LBS | HEIGHT: 68 IN | DIASTOLIC BLOOD PRESSURE: 78 MMHG | SYSTOLIC BLOOD PRESSURE: 122 MMHG | TEMPERATURE: 97.3 F | HEART RATE: 64 BPM

## 2023-08-30 DIAGNOSIS — Z12.5 SCREENING FOR PROSTATE CANCER: ICD-10-CM

## 2023-08-30 DIAGNOSIS — E34.9 HYPOTESTOSTERONISM: ICD-10-CM

## 2023-08-30 DIAGNOSIS — N40.0 PROSTATIC HYPERPLASIA: Primary | ICD-10-CM

## 2023-08-30 DIAGNOSIS — Z13.228 SCREENING FOR METABOLIC DISORDER: ICD-10-CM

## 2023-08-30 DIAGNOSIS — I10 ESSENTIAL HYPERTENSION: ICD-10-CM

## 2023-08-30 LAB
ANION GAP SERPL CALCULATED.3IONS-SCNC: 12 MMOL/L (ref 7–15)
BUN SERPL-MCNC: 20.8 MG/DL (ref 8–23)
CALCIUM SERPL-MCNC: 9.5 MG/DL (ref 8.8–10.2)
CHLORIDE SERPL-SCNC: 102 MMOL/L (ref 98–107)
CHOLEST SERPL-MCNC: 244 MG/DL
CREAT SERPL-MCNC: 1.2 MG/DL (ref 0.67–1.17)
DEPRECATED HCO3 PLAS-SCNC: 26 MMOL/L (ref 22–29)
GFR SERPL CREATININE-BSD FRML MDRD: 60 ML/MIN/1.73M2
GLUCOSE SERPL-MCNC: 88 MG/DL (ref 70–99)
HDLC SERPL-MCNC: 57 MG/DL
LDLC SERPL CALC-MCNC: 161 MG/DL
NONHDLC SERPL-MCNC: 187 MG/DL
POTASSIUM SERPL-SCNC: 4.4 MMOL/L (ref 3.4–5.3)
PSA SERPL DL<=0.01 NG/ML-MCNC: 11.6 NG/ML
SHBG SERPL-SCNC: 41 NMOL/L (ref 11–80)
SODIUM SERPL-SCNC: 140 MMOL/L (ref 136–145)
TRIGL SERPL-MCNC: 132 MG/DL

## 2023-08-30 PROCEDURE — 84153 ASSAY OF PSA TOTAL: CPT | Mod: ORL | Performed by: FAMILY MEDICINE

## 2023-08-30 PROCEDURE — 80048 BASIC METABOLIC PNL TOTAL CA: CPT | Mod: ORL | Performed by: FAMILY MEDICINE

## 2023-08-30 PROCEDURE — 84270 ASSAY OF SEX HORMONE GLOBUL: CPT | Mod: ORL | Performed by: FAMILY MEDICINE

## 2023-08-30 PROCEDURE — 80061 LIPID PANEL: CPT | Mod: ORL | Performed by: FAMILY MEDICINE

## 2023-08-30 PROCEDURE — 84403 ASSAY OF TOTAL TESTOSTERONE: CPT | Mod: ORL | Performed by: FAMILY MEDICINE

## 2023-08-30 RX ORDER — TADALAFIL 5 MG/1
5 TABLET ORAL
COMMUNITY
End: 2023-10-18

## 2023-08-30 NOTE — NURSING NOTE
"82 year old    Chief Complaint   Patient presents with    Physical     Would like testosterone levels checked prior to endocrine appointment in November. Mini-Cog was completed a few days ago with insurance/Medicare:             Blood pressure 122/78, pulse 64, temperature 97.3  F (36.3  C), temperature source Temporal, height 1.727 m (5' 8\"), weight 75.2 kg (165 lb 12 oz), SpO2 99 %. Body mass index is 25.2 kg/m .    Patient Active Problem List   Diagnosis    Preventative health care    Prostatic hyperplasia    Raynaud's syndrome    Persistent insomnia    Hypotestosteronism    Essential hypertension    Medicare annual wellness visit, subsequent    Trigger middle finger of right hand              Wt Readings from Last 2 Encounters:   23 75.2 kg (165 lb 12 oz)   23 76.7 kg (169 lb)       BP Readings from Last 3 Encounters:   23 122/78   23 100/64   22 122/75                Current Outpatient Medications   Medication    amLODIPine (NORVASC) 5 MG tablet    Cholecalciferol (VITAMIN D) 1000 UNITS capsule    clonazePAM (KLONOPIN) 1 MG tablet    HERBALS    lisinopril (ZESTRIL) 5 MG tablet    amoxicillin (AMOXIL) 875 MG tablet    tadalafil (CIALIS) 5 MG tablet     No current facility-administered medications for this visit.              Social History     Tobacco Use    Smoking status: Former     Types: Cigarettes     Quit date:      Years since quittin.6    Smokeless tobacco: Never    Tobacco comments:     1/2 - 1 PPD over 30 years,    Vaping Use    Vaping Use: Never used   Substance Use Topics    Alcohol use: Not Currently     Comment: 3 drinks per month    Drug use: No              Health Maintenance Due   Topic Date Due    ADVANCE CARE PLANNING  Never done    COVID-19 Vaccine (5 - Pfizer series) 2022    DTAP/TDAP/TD IMMUNIZATION (2 - Td or Tdap) 05/10/2023    MEDICARE ANNUAL WELLNESS VISIT  2023            No results found for: PAP           2023 9:07 " AM

## 2023-09-01 LAB
TESTOST FREE SERPL-MCNC: 5.19 NG/DL
TESTOST SERPL-MCNC: 302 NG/DL (ref 240–950)

## 2023-09-18 DIAGNOSIS — F51.01 PRIMARY INSOMNIA: ICD-10-CM

## 2023-09-18 NOTE — TELEPHONE ENCOUNTER
Medication requested: clonazePAM (KLONOPIN) 1 MG tablet   Last office visit: 8/30/23  [facility] appointments: none  Medication last refilled: 8/17/23; 60 + 0 refills, last sold 8/19/23 per   Last qualifying labs: N/A    Routing refill request to provider for review/approval because:  Drug not on the [facility] refill protocol     Rufino MUNGUIA, RN  09/18/23 1:14 PM

## 2023-09-19 RX ORDER — CLONAZEPAM 1 MG/1
2 TABLET ORAL
Qty: 60 TABLET | Refills: 0 | Status: SHIPPED | OUTPATIENT
Start: 2023-09-19 | End: 2023-10-18

## 2023-10-18 DIAGNOSIS — F51.01 PRIMARY INSOMNIA: ICD-10-CM

## 2023-10-18 RX ORDER — CLONAZEPAM 1 MG/1
2 TABLET ORAL
Qty: 60 TABLET | Refills: 0 | Status: SHIPPED | OUTPATIENT
Start: 2023-10-18 | End: 2023-11-16

## 2023-10-18 NOTE — TELEPHONE ENCOUNTER
Clonazepam (Klonopin) 1 mg    Last Office Visit: 8/30/23  [facility] Appointments: None  Medication last refilled: 9/19/23 #60 with 0 refill(s)     verified - last fill date: 9/19/23 #60    Routing refill request to provider for review/approval because:  Drug not on the [facility] refill protocol     VERNELL MartinesN, RN, CCM

## 2023-11-16 DIAGNOSIS — F51.01 PRIMARY INSOMNIA: ICD-10-CM

## 2023-11-16 RX ORDER — CLONAZEPAM 1 MG/1
2 TABLET ORAL
Qty: 60 TABLET | Refills: 0 | Status: SHIPPED | OUTPATIENT
Start: 2023-11-16 | End: 2023-12-15

## 2023-11-16 NOTE — TELEPHONE ENCOUNTER
Clonazepam (Klonopin) 1 mg    Last Office Visit: 8/30/23  [facility] Appointments: None  Medication last refilled: 10/18/23 #60 with 0 refill(s)     verified - last fill date: 10/19/23 #60    Routing refill request to provider for review/approval because:  Drug not on the [facility] refill protocol     VERNELL MartinesN, RN, CCM

## 2023-12-15 DIAGNOSIS — F51.01 PRIMARY INSOMNIA: ICD-10-CM

## 2023-12-15 RX ORDER — CLONAZEPAM 1 MG/1
2 TABLET ORAL
Qty: 60 TABLET | Refills: 0 | Status: SHIPPED | OUTPATIENT
Start: 2023-12-15 | End: 2024-01-16

## 2023-12-15 NOTE — TELEPHONE ENCOUNTER
Clonazepam (Klonopin) 1 mg    Last Office Visit: 8/30/23  [facility] [place] Appointments: None  Medication last refilled: 11/16/23 #60 with 0 refill(s)     verified - last fill date: 11/18/23 #60    Routing refill request to provider for review/approval because:  Drug not on the [facility] refill protocol     VERNELL MartinesN, RN, CCM

## 2024-01-16 DIAGNOSIS — F51.01 PRIMARY INSOMNIA: ICD-10-CM

## 2024-01-17 RX ORDER — CLONAZEPAM 1 MG/1
2 TABLET ORAL
Qty: 60 TABLET | Refills: 0 | Status: SHIPPED | OUTPATIENT
Start: 2024-01-17 | End: 2024-02-16

## 2024-01-17 NOTE — TELEPHONE ENCOUNTER
Clonazepam (Klonopin) 1 mg    Last Office Visit: 8/30/23  [facility] Appointments: None  Medication last refilled: 12/15/23 #60 with 0 refill(s)     verified - last fill date: 12/18/23 #60    Routing refill request to provider for review/approval because:  Drug not on the [facility] refill protocol     VERNELL MartinesN, RN, CCM

## 2024-02-16 DIAGNOSIS — F51.01 PRIMARY INSOMNIA: ICD-10-CM

## 2024-02-16 RX ORDER — CLONAZEPAM 1 MG/1
2 TABLET ORAL
Qty: 60 TABLET | Refills: 0 | Status: SHIPPED | OUTPATIENT
Start: 2024-02-16 | End: 2024-03-18

## 2024-02-16 NOTE — TELEPHONE ENCOUNTER
Clonazepam (Klonopin) 1 mg    Last Office Visit: 8/30/23  [facility] Appointments: None  Medication last refilled: 1/17/24 #60 with 0 refill(s)     verified - last fill date: 1/18/24 #60    Routing refill request to provider for review/approval because:  Drug not on the [facility] refill protocol     VERNELL MartinesN, RN, CCM

## 2024-03-18 DIAGNOSIS — F51.01 PRIMARY INSOMNIA: ICD-10-CM

## 2024-03-18 NOTE — TELEPHONE ENCOUNTER
Clonazepam (Klonopin) 1 mg    Last Office Visit: 8/30/23  Future [facility] Appointments: None  Medication last refilled: 2/16/24 #60 with 0 refill(s)     verified - last fill date: 2/16/24 #60    Routing refill request to provider for review/approval because:  Drug not on the [facility] refill protocol     VERNELL MartinesN, RN, CCM

## 2024-03-19 RX ORDER — CLONAZEPAM 1 MG/1
2 TABLET ORAL
Qty: 60 TABLET | Refills: 0 | Status: SHIPPED | OUTPATIENT
Start: 2024-03-19 | End: 2024-04-17

## 2024-03-20 DIAGNOSIS — I10 ESSENTIAL HYPERTENSION: Chronic | ICD-10-CM

## 2024-03-20 RX ORDER — AMLODIPINE BESYLATE 5 MG/1
5 TABLET ORAL DAILY
Qty: 30 TABLET | Refills: 0 | Status: SHIPPED | OUTPATIENT
Start: 2024-03-20 | End: 2024-04-17

## 2024-03-20 RX ORDER — LISINOPRIL 5 MG/1
5 TABLET ORAL DAILY
Qty: 30 TABLET | Refills: 0 | Status: SHIPPED | OUTPATIENT
Start: 2024-03-20 | End: 2024-04-17

## 2024-03-20 NOTE — TELEPHONE ENCOUNTER
Lisinopril (Zestril) 5 mg + Amlodipine (Norvasc) 5 mg    Last Office Visit: 8/30/23  [facility] Appointments: None    Medication last refilled:   8/25/23 #90 with 1 refill(s) - Lisinopril  2/17//23 #90 with 3 refill(s) - Amlodipine    Vital Signs Systolic Diastolic   8/30/23 122 78   11/23/22 122 75   6/6/22 123 78     Required labs per protocol:    LAB REF RANGE 8/2/22 8/30/23   GFR >60 mL/min/1.73m2  59 Low 60 Low   Creatinine 0.67-1.17 mg/dL 1.23 High 1.20 High   Potassium 3.4-5.3 mmol/L 4.4 4.4     Medication is being filled for 1 time refill only due to:  Patient needs to be seen because due for blood pressure follow up.    Propers message sent to patient to call and schedule appointment.    Jaye Knutson BSN, RN, CCM

## 2024-04-17 DIAGNOSIS — I10 ESSENTIAL HYPERTENSION: Chronic | ICD-10-CM

## 2024-04-17 DIAGNOSIS — F51.01 PRIMARY INSOMNIA: ICD-10-CM

## 2024-04-17 RX ORDER — LISINOPRIL 5 MG/1
5 TABLET ORAL DAILY
Qty: 30 TABLET | Refills: 0 | Status: SHIPPED | OUTPATIENT
Start: 2024-04-17 | End: 2024-05-13

## 2024-04-17 RX ORDER — CLONAZEPAM 1 MG/1
2 TABLET ORAL
Qty: 60 TABLET | Refills: 0 | Status: SHIPPED | OUTPATIENT
Start: 2024-04-17 | End: 2024-05-17

## 2024-04-17 RX ORDER — AMLODIPINE BESYLATE 5 MG/1
5 TABLET ORAL DAILY
Qty: 30 TABLET | Refills: 0 | Status: SHIPPED | OUTPATIENT
Start: 2024-04-17 | End: 2024-05-13

## 2024-04-17 NOTE — TELEPHONE ENCOUNTER
Amlodipine (Norvasc) 5 mg, Lisinopril (Zestril) 5 mg + Clonazepam (Klonopin) 1 mg     Last Office Visit: 8/30/23  [facility] Appointments: None    Medication last refilled:   3/20/24 #30 with 0 refill(s) - Amlodipine  3/20/24 #30 with 0 refill(s) - Lisinopril  3/19/24 #60 with 0 refill(s) - Clonazepam     verified - last fill date: 3/19/24 #60    Vital Signs Systolic Diastolic   8/30/23 122 78   3/27/23 100 64   11/23/22 122 75     Required labs per protocol:    LAB REF RANGE 8/2/22 8/30/23   GFR >60 mL/min/1.73m2  59 Low 60 Low   Creatinine 0.67-1.17 mg/dL 1.23 High 1.20 High   Potassium 3.4-5.3 mmol/L 4.4 4.4     Medication is being filled for 1 time refill only due to:  Patient needs to be seen because due for blood pressure follow up.    Message sent to scheduling to call patient and schedule a blood pressure follow up appointment.    VERNELL MartinesN, RN, CCM

## 2024-04-19 ENCOUNTER — TELEPHONE (OUTPATIENT)
Dept: FAMILY MEDICINE | Facility: CLINIC | Age: 84
End: 2024-04-19

## 2024-05-13 DIAGNOSIS — I10 ESSENTIAL HYPERTENSION: Chronic | ICD-10-CM

## 2024-05-13 RX ORDER — LISINOPRIL 5 MG/1
5 TABLET ORAL DAILY
Qty: 30 TABLET | Refills: 0 | Status: SHIPPED | OUTPATIENT
Start: 2024-05-13 | End: 2024-07-05

## 2024-05-13 RX ORDER — AMLODIPINE BESYLATE 5 MG/1
5 TABLET ORAL DAILY
Qty: 30 TABLET | Refills: 0 | Status: SHIPPED | OUTPATIENT
Start: 2024-05-13 | End: 2024-07-05

## 2024-05-13 NOTE — TELEPHONE ENCOUNTER
Medication requested: lisinopril (ZESTRIL) 5 MG tablet   Last office visit: 8/30/23  [facility] appointments: none  Medication last refilled: 4/17/24; 30 + 0 refills  Last qualifying labs:   BP Readings from Last 3 Encounters:   08/30/23 122/78   03/27/23 100/64   11/23/22 122/75     Component      Latest Ref Rng 8/30/2023  9:26 AM   Potassium      3.4 - 5.3 mmol/L 4.4      Component      Latest Ref Rng 8/30/2023  9:26 AM   Creatinine      0.67 - 1.17 mg/dL 1.20 (H)      Medication requested: amLODIPine (NORVASC) 5 MG tablet   Medication last refilled: 4/17/24; 30 + 0 refills  Last qualifying labs: See above    Routing refill request to provider for review/approval because:  Elana given x1 and patient did not follow up, please advise    Rufino MUNGIUA, RN  05/13/24 12:26 PM

## 2024-05-17 DIAGNOSIS — F51.01 PRIMARY INSOMNIA: ICD-10-CM

## 2024-05-17 RX ORDER — CLONAZEPAM 1 MG/1
2 TABLET ORAL
Qty: 60 TABLET | Refills: 0 | Status: SHIPPED | OUTPATIENT
Start: 2024-05-17 | End: 2024-06-17

## 2024-05-17 NOTE — TELEPHONE ENCOUNTER
Medication requested: clonazePAM (KLONOPIN) 1 MG tablet   Last office visit: 8/30/23  [facility] appointments: none  Medication last refilled: 4/17/24; 60 + 0 refills, last sold 4/19/24 per   Last qualifying labs: N/A    Routing refill request to provider for review/approval because:  Drug not on the [facility] refill protocol     Rufino MUNGUIA, RN  05/17/24 11:28 AM

## 2024-06-17 DIAGNOSIS — F51.01 PRIMARY INSOMNIA: ICD-10-CM

## 2024-06-18 RX ORDER — CLONAZEPAM 1 MG/1
2 TABLET ORAL
Qty: 60 TABLET | Refills: 0 | Status: SHIPPED | OUTPATIENT
Start: 2024-06-18 | End: 2024-07-16

## 2024-06-18 NOTE — TELEPHONE ENCOUNTER
reviewed, med refilled during Van's absence.    Joe was seen today for refill request.    Diagnoses and all orders for this visit:    Primary insomnia  -     clonazePAM (KLONOPIN) 1 MG tablet; Take 2 tablets (2 mg) by mouth nightly as needed for sleep can be refilled every 30 days      Sheldon Kenyon MD  11:18 AM, June 18, 2024

## 2024-06-18 NOTE — TELEPHONE ENCOUNTER
Medication requested: clonazePAM (KLONOPIN) 1 MG tablet   Last office visit: 8/30/23  [facility] appointments: none  Medication last refilled: 5/17/24; 60 + 0 refills, last sold 5/20/24 per   Last qualifying labs: N/A    Routing refill request to provider for review/approval because:  Drug not on the [facility] refill protocol     Rufino MUNGUIA, RN  06/18/24 9:25 AM

## 2024-07-05 DIAGNOSIS — I10 ESSENTIAL HYPERTENSION: Chronic | ICD-10-CM

## 2024-07-05 RX ORDER — AMLODIPINE BESYLATE 5 MG/1
5 TABLET ORAL DAILY
Qty: 15 TABLET | Refills: 0 | Status: SHIPPED | OUTPATIENT
Start: 2024-07-05 | End: 2024-07-24

## 2024-07-05 RX ORDER — LISINOPRIL 5 MG/1
5 TABLET ORAL DAILY
Qty: 15 TABLET | Refills: 0 | Status: SHIPPED | OUTPATIENT
Start: 2024-07-05 | End: 2024-07-24

## 2024-07-05 NOTE — TELEPHONE ENCOUNTER
Amlodipine (Norvasc) 5 mg +  Lisinopril (Zestril) 5 mg    Last Office Visit: 8/30/23  [facility] Appointments: None  Medication last refilled:     Medication last refilled:   5/13/24 #30 with 0 refill(s) - Amlodipine  5/13/24 #30 with 0 refill(s) - Lisinopril    Vital Signs Systolic Diastolic   8/30/23 122 78   11/23/22 122 75   10/5/21 112 70     Required labs per protocol:    LAB REF RANGE 8/2/22 8/30/23   GFR >60 mL/min/1.73m2  59 Low 60 Low   Creatinine 0.67-1.17 mg/dL 1.23 High 1.20 High   Potassium 3.4-5.3 mmol/L 4.4 4.4     Medication is being filled for 1 time refill only due to:  Patient needs labs BMP. Patient needs to be seen because due for BP recheck.    Message sent to scheduling to call patient and schedule appointment.    Jaye Knutson, BSN, RN, CCM

## 2024-07-16 DIAGNOSIS — F51.01 PRIMARY INSOMNIA: ICD-10-CM

## 2024-07-17 RX ORDER — CLONAZEPAM 1 MG/1
2 TABLET ORAL
Qty: 60 TABLET | Refills: 0 | Status: SHIPPED | OUTPATIENT
Start: 2024-07-17 | End: 2024-08-19

## 2024-07-17 NOTE — TELEPHONE ENCOUNTER
Medication requested: clonazePAM (KLONOPIN) 1 MG tablet   Last office visit: 8/30/23  [facility] appointments: 7/24/24  Medication last refilled: 6/18/24; 60 + 0 refills, last sold 6/19/24 per   Last qualifying labs: N/A    Routing refill request to provider for review/approval because:  Drug not on the [facility] refill protocol     Rufino MUNGUIA, RN  07/17/24 12:31 PM

## 2024-07-24 ENCOUNTER — OFFICE VISIT (OUTPATIENT)
Dept: FAMILY MEDICINE | Facility: CLINIC | Age: 84
End: 2024-07-24
Payer: COMMERCIAL

## 2024-07-24 VITALS
HEIGHT: 68 IN | SYSTOLIC BLOOD PRESSURE: 119 MMHG | TEMPERATURE: 98 F | HEART RATE: 66 BPM | DIASTOLIC BLOOD PRESSURE: 72 MMHG | OXYGEN SATURATION: 96 % | WEIGHT: 161.12 LBS | BODY MASS INDEX: 24.42 KG/M2

## 2024-07-24 DIAGNOSIS — Z00.00 MEDICARE ANNUAL WELLNESS VISIT, SUBSEQUENT: Primary | ICD-10-CM

## 2024-07-24 DIAGNOSIS — I10 ESSENTIAL HYPERTENSION: Chronic | ICD-10-CM

## 2024-07-24 DIAGNOSIS — Z12.5 SCREENING FOR PROSTATE CANCER: ICD-10-CM

## 2024-07-24 PROCEDURE — G0103 PSA SCREENING: HCPCS | Mod: ORL | Performed by: FAMILY MEDICINE

## 2024-07-24 PROCEDURE — 80048 BASIC METABOLIC PNL TOTAL CA: CPT | Mod: ORL | Performed by: FAMILY MEDICINE

## 2024-07-24 RX ORDER — AMLODIPINE BESYLATE 5 MG/1
5 TABLET ORAL DAILY
Qty: 90 TABLET | Refills: 4 | Status: SHIPPED | OUTPATIENT
Start: 2024-07-24

## 2024-07-24 NOTE — PATIENT INSTRUCTIONS
Please get a Tdap (tetanus) booster at [facility]. That's good for 10 years.  Also, consider getting the RSV vaccine at [facility].  This is a one-and-done vaccine.

## 2024-07-24 NOTE — NURSING NOTE
"83 year old  Chief Complaint   Patient presents with    Hypertension     Review and refill medications.        Blood pressure 119/72, pulse 66, temperature 98  F (36.7  C), height 1.727 m (5' 7.99\"), weight 73.1 kg (161 lb 1.9 oz), SpO2 96%. Body mass index is 24.5 kg/m .  Patient Active Problem List   Diagnosis    Preventative health care    Prostatic hyperplasia    Raynaud's syndrome    Persistent insomnia    Hypotestosteronism    Essential hypertension    Medicare annual wellness visit, subsequent    Trigger middle finger of right hand       Wt Readings from Last 2 Encounters:   24 73.1 kg (161 lb 1.9 oz)   23 75.2 kg (165 lb 12 oz)     BP Readings from Last 3 Encounters:   24 119/72   23 122/78   23 100/64         Current Outpatient Medications   Medication Sig Dispense Refill    amLODIPine (NORVASC) 5 MG tablet Take 1 tablet (5 mg) by mouth daily NEEDS APPOINTMENT 15 tablet 0    Cholecalciferol (VITAMIN D) 1000 UNITS capsule Take 1 capsule by mouth daily      clonazePAM (KLONOPIN) 1 MG tablet Take 2 tablets (2 mg) by mouth nightly as needed for sleep can be refilled every 30 days 60 tablet 0    lisinopril (ZESTRIL) 5 MG tablet Take 1 tablet (5 mg) by mouth daily NEEDS APPOINTMENT 15 tablet 0    HERBALS  (Patient not taking: Reported on 2024)       No current facility-administered medications for this visit.       Social History     Tobacco Use    Smoking status: Former     Current packs/day: 0.00     Types: Cigarettes     Quit date:      Years since quittin.5    Smokeless tobacco: Never    Tobacco comments:     1/2 - 1 PPD over 30 years,    Vaping Use    Vaping status: Never Used   Substance Use Topics    Alcohol use: Not Currently     Comment: 3 drinks per month    Drug use: No       Health Maintenance Due   Topic Date Due    ADVANCE CARE PLANNING  Never done    RSV VACCINE (Pregnancy & 60+) (1 - 1-dose 60+ series) Never done    DTAP/TDAP/TD IMMUNIZATION (2 - Td or " "Tdap) 05/10/2023    COVID-19 Vaccine (6 - 2023-24 season) 04/23/2024       No results found for: \"PAP\"      July 24, 2024 11:08 AM    "

## 2024-07-25 LAB
ANION GAP SERPL CALCULATED.3IONS-SCNC: 12 MMOL/L (ref 7–15)
BUN SERPL-MCNC: 20.5 MG/DL (ref 8–23)
CALCIUM SERPL-MCNC: 9.5 MG/DL (ref 8.8–10.4)
CHLORIDE SERPL-SCNC: 105 MMOL/L (ref 98–107)
CREAT SERPL-MCNC: 1.11 MG/DL (ref 0.67–1.17)
EGFRCR SERPLBLD CKD-EPI 2021: 65 ML/MIN/1.73M2
GLUCOSE SERPL-MCNC: 92 MG/DL (ref 70–99)
HCO3 SERPL-SCNC: 24 MMOL/L (ref 22–29)
POTASSIUM SERPL-SCNC: 4.2 MMOL/L (ref 3.4–5.3)
PSA SERPL DL<=0.01 NG/ML-MCNC: 11.3 NG/ML
SODIUM SERPL-SCNC: 141 MMOL/L (ref 135–145)

## 2024-08-11 NOTE — PROGRESS NOTES
"Preventive Care Visit  [facility]  Van Foss MD, Family Medicine  Jul 24, 2024    Cuba Diaz is a 83 year old, presenting for the following:  Hypertension (Review and refill medications. )    LUISA Diaz is a an 83-year-old here today for his Medicare annual wellness visit, subsequent.  Overall, he is doing very well.    He has been taking a supplement now for about a year and a half and he thinks it is really helping.    He has a history of significant PSA elevation.  Last summer it was 11.6.  However, he would like to check it again to see where it sat.    He has a history of hypertension and had been on both amlodipine 5 mg daily and lisinopril 5 mg daily.  However, he has not taken the lisinopril now for a few weeks and his blood pressure has been excellent.  Therefore, we we will eliminate that from his list.    He notes that he is developed some tolerance to clonazepam 1 mg tabs.  He will often take 2 together at night to help him sleep.  He has used some Robaxin for muscle spasms.  He will get up at night perhaps once to void.    He has some neuropathy in his legs.  He saw his chiropractor who tried some electronic massage which was somewhat helpful.  Made a good conversation about that.    Finally, he states his feet are cold \"100% of the time.\"  Walking is going a little harder.  He needed some help getting up on the exam table.    From a healthcare maintenance standpoint, he is up-to-date with almost all of his healthcare maintenance strategies.  He needs some immunizations which I will outline below.        7/24/2024   General Health   How would you rate your overall physical health? Good   Feel stress (tense, anxious, or unable to sleep) Only a little            7/24/2024   Nutrition   Diet: Breakfast skipped            7/24/2024   Exercise   Days per week of moderate/strenous exercise 0 days            7/24/2024   Social Factors   Frequency of gathering with friends or " relatives Patient declined            2022   Activities of Daily Living- Home Safety   Needs help with the following daily activites NO assistance is needed   Safety concerns in the home None of the above            2024   Dental   Dentist two times every year? (!) NO            2022   Hearing Screening   Hearing concerns? No concerns      Today's PHQ-2 Score:       2024    11:03 AM   PHQ-2 (  [employer])   Q1: Little interest or pleasure in doing things 0   Q2: Feeling down, depressed or hopeless 0   PHQ-2 Score 0   Q1: Little interest or pleasure in doing things Not at all   Q2: Feeling down, depressed or hopeless Not at all   PHQ-2 Score 0           2024   Substance Use   Alcohol more than 3/day or more than 7/wk No   Do you have a current opioid prescription? No   How severe/bad is pain from 1 to 10? 2/10   Do you use any other substances recreationally? No        Social History     Tobacco Use    Smoking status: Former     Current packs/day: 0.00     Types: Cigarettes     Quit date:      Years since quittin.6    Smokeless tobacco: Never    Tobacco comments:     1/2 - 1 PPD over 30 years,    Vaping Use    Vaping status: Never Used   Substance Use Topics    Alcohol use: Not Currently     Comment: 3 drinks per month    Drug use: No     Family History   Problem Relation Age of Onset    Autism Spectrum Disorder Brother         committed suicide       The following health maintenance items are reviewed in Epic and correct as of today:  Health Maintenance   Topic Date Due    ADVANCE CARE PLANNING  Never done    RSV VACCINE (Pregnancy & 60+) (1 - 1-dose 60+ series) Never done    DTAP/TDAP/TD IMMUNIZATION (2 - Td or Tdap) 05/10/2023    COVID-19 Vaccine ( - - season) 2024    FALL RISK ASSESSMENT  2024    INFLUENZA VACCINE (1) 2024    MEDICARE ANNUAL WELLNESS VISIT  2025    PHQ-2 (once per calendar year)  Completed    Pneumococcal Vaccine: 65+ Years  Completed  "   ZOSTER IMMUNIZATION  Completed    IPV IMMUNIZATION  Aged Out    HPV IMMUNIZATION  Aged Out    MENINGITIS IMMUNIZATION  Aged Out    RSV MONOCLONAL ANTIBODY  Aged Out     Review of Systems  Constitutional, neuro, ENT, endocrine, pulmonary, cardiac, gastrointestinal, genitourinary, musculoskeletal, integument and psychiatric systems are negative, except as otherwise noted.     Objective    Exam  /72   Pulse 66   Temp 98  F (36.7  C)   Ht 1.727 m (5' 7.99\")   Wt 73.1 kg (161 lb 1.9 oz)   SpO2 96%   BMI 24.50 kg/m       Estimated body mass index is 24.5 kg/m  as calculated from the following:    Height as of this encounter: 1.727 m (5' 7.99\").    Weight as of this encounter: 73.1 kg (161 lb 1.9 oz).    Physical Exam  GENERAL: alert and no distress  EYES: Eyes grossly normal to inspection, PERRL and conjunctivae and sclerae normal  HENT: ear canals and TM's normal, nose and mouth without ulcers or lesions  NECK: no adenopathy, no asymmetry, masses, or scars  RESP: lungs clear to auscultation - no rales, rhonchi or wheezes  CV: regular rate and rhythm, normal S1 S2, no S3 or S4, no murmur, click or rub, no peripheral edema  MS: no gross musculoskeletal defects noted, no edema  SKIN: no suspicious lesions or rashes  NEURO: Normal strength and tone, mentation intact and speech normal  PSYCH: mentation appears normal, affect normal/bright    Laboratory studies: Basic metabolic panel and PSA 50, both pending      Assessment/Plan:    Joe is an 83-year-old here today for his Medicare annual wellness visit, subsequent.  He is up-to-date with almost all of his healthcare maintenance strategies.  I recommend the following:    Please get a Tdap (tetanus) booster at [facility]. That's good for 10 years.  Also, consider getting the RSV vaccine at [facility].  This is a one-and-done vaccine.    Joe has a history of essential hypertension.  He is off of his lisinopril and his blood pressure is 119/72.  He will continue taking " amlodipine 5 mg daily.  I will discontinue the lisinopril officially at this time.    History of elevated PSA.  He may check another level again today just for documentation purposes.  He knows it will be elevated.    Ongoing neuropathy in his legs.  He thinks that the chiropractic treatment he received is helpful.  If his glucose is elevated today, I will add an A1c just to make sure that he is not experiencing prediabetes or even diabetes.    I look forward to seeing her back in approximately 1 year.  He will reach out the meantime with any questions or concerns.      Signed Electronically by: Van Foss MD

## 2024-08-19 DIAGNOSIS — F51.01 PRIMARY INSOMNIA: ICD-10-CM

## 2024-08-20 RX ORDER — CLONAZEPAM 1 MG/1
2 TABLET ORAL
Qty: 60 TABLET | Refills: 0 | Status: SHIPPED | OUTPATIENT
Start: 2024-08-20 | End: 2024-09-18

## 2024-08-20 NOTE — TELEPHONE ENCOUNTER
Medication requested: clonazePAM (KLONOPIN) 1 MG tablet   Last office visit: 7/24/24  [facility] appointments: none  Medication last refilled: 7/17/24; 60 + 0 refills, last sold 7/18/24 per   Last qualifying labs: N/A    Routing refill request to provider for review/approval because:  Drug not on the [facility] refill protocol     Rufino MUNGUIA, RN  08/20/24 9:34 AM

## 2024-09-18 DIAGNOSIS — F51.01 PRIMARY INSOMNIA: ICD-10-CM

## 2024-09-18 NOTE — TELEPHONE ENCOUNTER
Medication requested: clonazePAM (KLONOPIN) 1 MG tablet   Last office visit: 7/24/24  [facility] appointments: none  Medication last refilled: 8/20/24; 60 + 0, last sold 8/22/24 per   Last qualifying labs: N/A    *Hold until Thursday, 9/19*    Routing refill request to provider for review/approval because:  Drug not on the [facility] refill protocol     Rufino MUNGUIA, RN  09/18/24 12:09 PM

## 2024-09-19 RX ORDER — CLONAZEPAM 1 MG/1
2 TABLET ORAL
Qty: 60 TABLET | Refills: 0 | Status: SHIPPED | OUTPATIENT
Start: 2024-09-19

## 2024-10-17 DIAGNOSIS — F51.01 PRIMARY INSOMNIA: ICD-10-CM

## 2024-10-17 RX ORDER — CLONAZEPAM 1 MG/1
2 TABLET ORAL
Qty: 60 TABLET | Refills: 0 | Status: SHIPPED | OUTPATIENT
Start: 2024-10-17

## 2024-10-17 NOTE — TELEPHONE ENCOUNTER
Clonazepam (Klonopin) 1 mg    Last Office Visit: 7/24/24  [facility] [place] Appointments: None  Medication last refilled: 9/19/24 #60 with 0 refill(s)     verified - last fill date: 9/21/24 #60    CSA on File - None    Routing refill request to provider for review/approval because:  Drug not on the [facility] refill protocol     NILDA Martines, RN, CCM

## 2024-11-15 DIAGNOSIS — F51.01 PRIMARY INSOMNIA: ICD-10-CM

## 2024-11-15 NOTE — TELEPHONE ENCOUNTER
Medication requested: clonazePAM (KLONOPIN) 1 MG tablet   Last office visit: 7/24/2024  [facility] appointments: none  Medication last refilled: 10/17/2024; 60 tabs + 0 refills; last sold #60 on 10/22/2024 per   Last qualifying labs: n/a    Routing refill request to provider for review/approval because:  Drug not on the [facility] [place] [facility] refill protocol     Hold until 11/19/2024    NILDA Polanco, RN  11/15/24, 2:12 PM

## 2024-11-19 RX ORDER — CLONAZEPAM 1 MG/1
2 TABLET ORAL
Qty: 60 TABLET | Refills: 0 | Status: SHIPPED | OUTPATIENT
Start: 2024-11-19

## 2024-12-18 DIAGNOSIS — F51.01 PRIMARY INSOMNIA: ICD-10-CM

## 2024-12-18 RX ORDER — CLONAZEPAM 1 MG/1
2 TABLET ORAL
Qty: 60 TABLET | Refills: 0 | Status: SHIPPED | OUTPATIENT
Start: 2024-12-18

## 2024-12-18 NOTE — TELEPHONE ENCOUNTER
Clonazepam (Klonopin) 1 mg    Last Office Visit: 7/24/24  [facility] [place] Appointments: None  Medication last refilled: 11/19/24 #60 with 0 refill(s)     verified - last fill date: 11/20/24 #60    CSA on File - None    Routing refill request to provider for review/approval because:  Drug not on the [facility] refill protocol     NILDA Martines, RN, CCM

## 2025-01-16 DIAGNOSIS — F51.01 PRIMARY INSOMNIA: ICD-10-CM

## 2025-01-16 RX ORDER — CLONAZEPAM 1 MG/1
2 TABLET ORAL
Qty: 60 TABLET | Refills: 0 | Status: SHIPPED | OUTPATIENT
Start: 2025-01-16

## 2025-01-16 NOTE — TELEPHONE ENCOUNTER
Clonazepam (Klonopin) 1 mg    Last Office Visit: 7/24/24  [facility] [place]ointments: None  Medication last refilled: 12/18/24 #60 with 0 refill(s)     verified - last fill date: 12/19/24 #60    CSA on File - None    Routing refill request to provider for review/approval because:  Drug not on the [facility] refill protocol     NILDA Martines, RN, CCM

## 2025-02-17 DIAGNOSIS — F51.01 PRIMARY INSOMNIA: ICD-10-CM

## 2025-02-17 NOTE — TELEPHONE ENCOUNTER
Clonazepam (Klonopin) 1 mg    Last Office Visit: 7/24/24  [facility] Appointments: None  Medication last refilled: 1/16/25 #60 with 0 refill(s)     verified - last fill date: 1/20/25 #60    CSA on File - None    No MASON-7 on file.    Routing refill request to provider for review/approval because:  Drug not on the [facility] refill protocol     *Hold until 2/18/25    VERNELL MartinesN, RN, CCM

## 2025-02-19 RX ORDER — CLONAZEPAM 1 MG/1
2 TABLET ORAL
Qty: 60 TABLET | Refills: 0 | Status: SHIPPED | OUTPATIENT
Start: 2025-02-19

## 2025-02-19 NOTE — TELEPHONE ENCOUNTER
Patient called today and still hasn't picked up medication, I told Joe it would be filled yesterday.

## 2025-04-16 DIAGNOSIS — F51.01 PRIMARY INSOMNIA: ICD-10-CM

## 2025-04-16 NOTE — TELEPHONE ENCOUNTER
Medication requested: clonazePAM (KLONOPIN) 1 MG tablet   Last office visit: 7/24/24  [facility] appointments: none  Medication last refilled: 3/21/25; 60 + 0 refills, last sold 3/21/25 per   Last qualifying labs: N/A    *Hold until Thursday 4/17*    Routing refill request to provider for review/approval because:  Drug not on the [facility] refill protocol     Rufino MUNGUIA, RN  04/16/25 2:28 PM

## 2025-04-17 RX ORDER — CLONAZEPAM 1 MG/1
1 TABLET ORAL 2 TIMES DAILY PRN
Qty: 60 TABLET | Refills: 0 | Status: SHIPPED | OUTPATIENT
Start: 2025-04-17

## 2025-04-22 ENCOUNTER — OFFICE VISIT (OUTPATIENT)
Dept: FAMILY MEDICINE | Facility: CLINIC | Age: 85
End: 2025-04-22
Payer: COMMERCIAL

## 2025-04-22 VITALS
HEART RATE: 70 BPM | DIASTOLIC BLOOD PRESSURE: 66 MMHG | BODY MASS INDEX: 25.23 KG/M2 | WEIGHT: 157 LBS | OXYGEN SATURATION: 98 % | SYSTOLIC BLOOD PRESSURE: 106 MMHG | TEMPERATURE: 97.1 F | HEIGHT: 66 IN

## 2025-04-22 DIAGNOSIS — G62.9 NEUROPATHY: ICD-10-CM

## 2025-04-22 DIAGNOSIS — R26.89 SHUFFLING GAIT: Primary | ICD-10-CM

## 2025-04-22 NOTE — PROGRESS NOTES
"    ASSESSMENT and PLAN:     Oli is an 83 yo who has long standing neuropathic symptoms in legs and now with a shuffling gait. Concern for Parkinsons'. He requests a visit to [facility]    PLAN   Referred to [facility] Dept.   Fax to [phone]      Follow-up with Dr. Foss for annual exam in about 2 months. Return sooner if needed    Arnulfo Sullivan MD  Family Medicine and Sports Medicine  [facility]      Medical assistant intake:  Joe Lerner is a 84 year old male who presents to [facility] today for Referral ( For [facility])      SUBJECTIVE:   Oli is an 83 yo who normally sees Dr. Foss. He's here today to request a referral to [facility] for evaluation of neuropathy in legs.     He states that it started 50 years ago with \"cold feet.\"   A few years ago, he started to have balance and walking problems with tightness in legs and calves.     Review Of Systems:    Has otherwise been in usual state of health, e.g.   Cardiovascular: negative  Respiratory: No shortness of breath, dyspnea on exertion, cough, or hemoptysis  Gastrointestinal: negative  Genitourinary: negative    Problem list per EMR:  Patient Active Problem List   Diagnosis    Preventative health care    Prostatic hyperplasia    Raynaud's syndrome    Persistent insomnia    Hypotestosteronism    Essential hypertension    Medicare annual wellness visit, subsequent    Trigger middle finger of right hand       Current Outpatient Medications   Medication Sig Dispense Refill    amLODIPine (NORVASC) 5 MG tablet Take 1 tablet (5 mg) by mouth daily 90 tablet 4    Cholecalciferol (VITAMIN D) 1000 UNITS capsule Take 1 capsule by mouth daily      clonazePAM (KLONOPIN) 1 MG tablet Take 1 tablet (1 mg) by mouth 2 times daily as needed for anxiety. 60 tablet 0       Allergies   Allergen Reactions    Seasonal Allergies         Social:   An " "    OBJECTIVE    Vitals: /66 (BP Location: Right arm)   Pulse 70   Temp 97.1  F (36.2  C)   Ht 1.682 m (5' 6.22\")   Wt 71.2 kg (157 lb)   SpO2 98%   BMI 25.17 kg/m    BMI= Body mass index is 25.17 kg/m .   he appears in no distress.  His upper extremities appear to be normal.  I cannot notice any tremors.  His gait is shuffling with very short stride, and he reports difficulty with walking through narrow spaces.      Sensation was intact to light touch in the lower legs.    SEE TOP OF NOTE FOR ASSESSMENT AND PLAN    --Arnulfo Sullivan MD  [facility], Department of Family Medicine and Community Health  "

## 2025-04-22 NOTE — NURSING NOTE
"Joe  84 year old    Chief Complaint   Patient presents with    Referral      For [facility]           Blood pressure 106/66, pulse 70, temperature 97.1  F (36.2  C), height 1.682 m (5' 6.22\"), weight 71.2 kg (157 lb), SpO2 98%. Body mass index is 25.17 kg/m .    Patient Active Problem List   Diagnosis    Preventative health care    Prostatic hyperplasia    Raynaud's syndrome    Persistent insomnia    Hypotestosteronism    Essential hypertension    Medicare annual wellness visit, subsequent    Trigger middle finger of right hand             Wt Readings from Last 2 Encounters:   25 71.2 kg (157 lb)   24 73.1 kg (161 lb 1.9 oz)       BP Readings from Last 3 Encounters:   25 106/66   24 119/72   23 122/78                Current Outpatient Medications   Medication Sig Dispense Refill    amLODIPine (NORVASC) 5 MG tablet Take 1 tablet (5 mg) by mouth daily 90 tablet 4    Cholecalciferol (VITAMIN D) 1000 UNITS capsule Take 1 capsule by mouth daily      clonazePAM (KLONOPIN) 1 MG tablet Take 1 tablet (1 mg) by mouth 2 times daily as needed for anxiety. 60 tablet 0     No current facility-administered medications for this visit.             Social History     Tobacco Use    Smoking status: Former     Current packs/day: 0.00     Types: Cigarettes     Quit date:      Years since quittin.3    Smokeless tobacco: Never    Tobacco comments:     1/2 - 1 PPD over 30 years,    Vaping Use    Vaping status: Never Used   Substance Use Topics    Alcohol use: Not Currently     Comment: 3 drinks per month    Drug use: No             Health Maintenance Due   Topic Date Due    ADVANCE CARE PLANNING  Never done    RSV VACCINE (1 - 1-dose 75+ series) Never done    DTAP/TDAP/TD IMMUNIZATION (2 - Td or Tdap) 05/10/2023    FALL RISK ASSESSMENT  2024    INFLUENZA VACCINE (1) 2024    COVID-19 Vaccine (2024- season) 2024           No results found for: \"PAP\"         "   April 22, 2025 10:47 AM

## 2025-04-22 NOTE — PATIENT INSTRUCTIONS
ASSESSMENT and PLAN:     Oli is an 83 yo who has long standing neuropathic symptoms in legs and now with a shuffling gait. Concern for Parkinsons'. He requests a visit to [facility]    PLAN   Referred to [facility] Dept.   Fax to [phone]      Follow-up with Dr. Foss for annual exam in about 2 months. Return sooner if needed    Arnulfo Sullivan MD  Family Medicine and Sports Medicine  [facility]

## 2025-04-23 ENCOUNTER — PATIENT OUTREACH (OUTPATIENT)
Dept: CARE COORDINATION | Facility: CLINIC | Age: 85
End: 2025-04-23
Payer: COMMERCIAL

## 2025-05-15 DIAGNOSIS — F51.01 PRIMARY INSOMNIA: ICD-10-CM

## 2025-05-15 NOTE — TELEPHONE ENCOUNTER
Medication requested: clonazePAM (KLONOPIN) 1 MG tablet   Last office visit: 4/22/25  [facility] appointments: none  Medication last refilled: 4/17/25; 60 + 0 refills, last sold 4/21/25 per   Last qualifying labs: N/A    *Hold until Monday 5/19*    Routing refill request to provider for review/approval because:  Drug not on the [facility] refill protocol     Rufino MUNGUIA, RN  05/15/25 9:41 AM

## 2025-05-19 RX ORDER — CLONAZEPAM 1 MG/1
1 TABLET ORAL 2 TIMES DAILY PRN
Qty: 60 TABLET | Refills: 0 | Status: SHIPPED | OUTPATIENT
Start: 2025-05-19

## 2025-06-18 DIAGNOSIS — F51.01 PRIMARY INSOMNIA: ICD-10-CM

## 2025-06-18 RX ORDER — CLONAZEPAM 1 MG/1
1 TABLET ORAL 2 TIMES DAILY PRN
Qty: 60 TABLET | Refills: 0 | Status: SHIPPED | OUTPATIENT
Start: 2025-06-18

## 2025-06-18 NOTE — TELEPHONE ENCOUNTER
Medication requested: clonazePAM (KLONOPIN) 1 MG tablet   Last office visit: 4/22/2025  [facility] appointments: none  Medication last refilled: 5/19/2025; #60 + 0 refills; last sold #60 on 5/20/2025 per   Last qualifying labs: n/a    Routing refill request to provider for review/approval because:  Drug not on the [facility] refill protocol     NILDA Polanco, RN  06/18/25, 3:03 PM

## 2025-07-15 DIAGNOSIS — F51.01 PRIMARY INSOMNIA: ICD-10-CM

## 2025-07-15 NOTE — TELEPHONE ENCOUNTER
Medication requested: clonazePAM (KLONOPIN) 1 MG tablet   Last office visit: 4/22/25  [facility] appointments: none  Medication last refilled: 6/18/25; 60 + 0 refills, last sold 6/21/25 per   Last qualifying labs: N/A    Routing refill request to provider for review/approval because:  Drug not on the [facility] refill protocol     Rufino MUNGUIA, RN  07/15/25 3:28 PM

## 2025-07-16 RX ORDER — CLONAZEPAM 1 MG/1
1 TABLET ORAL 2 TIMES DAILY PRN
Qty: 60 TABLET | Refills: 0 | Status: SHIPPED | OUTPATIENT
Start: 2025-07-17

## 2025-08-11 DIAGNOSIS — I10 ESSENTIAL HYPERTENSION: Chronic | ICD-10-CM

## 2025-08-13 RX ORDER — AMLODIPINE BESYLATE 5 MG/1
5 TABLET ORAL DAILY
Qty: 90 TABLET | Refills: 0 | Status: SHIPPED | OUTPATIENT
Start: 2025-08-13